# Patient Record
Sex: MALE | Race: OTHER | HISPANIC OR LATINO | URBAN - METROPOLITAN AREA
[De-identification: names, ages, dates, MRNs, and addresses within clinical notes are randomized per-mention and may not be internally consistent; named-entity substitution may affect disease eponyms.]

---

## 2022-02-02 ENCOUNTER — HOSPITAL ENCOUNTER (INPATIENT)
Facility: HOSPITAL | Age: 47
LOS: 3 days | Discharge: HOME | DRG: 812 | End: 2022-02-05
Attending: EMERGENCY MEDICINE | Admitting: EMERGENCY MEDICINE
Payer: MEDICAID

## 2022-02-02 ENCOUNTER — APPOINTMENT (INPATIENT)
Dept: CT IMAGING | Facility: HOSPITAL | Age: 47
DRG: 812 | End: 2022-02-02
Payer: MEDICAID

## 2022-02-02 ENCOUNTER — APPOINTMENT (EMERGENCY)
Dept: RADIOLOGY | Facility: HOSPITAL | Age: 47
DRG: 812 | End: 2022-02-02
Payer: MEDICAID

## 2022-02-02 DIAGNOSIS — T40.2X1A OPIOID OVERDOSE (HCC): Primary | ICD-10-CM

## 2022-02-02 DIAGNOSIS — R42 DIZZINESS: ICD-10-CM

## 2022-02-02 DIAGNOSIS — Z72.89 ALCOHOL USE: ICD-10-CM

## 2022-02-02 DIAGNOSIS — F14.90 COCAINE USE: ICD-10-CM

## 2022-02-02 DIAGNOSIS — J96.00 ACUTE RESPIRATORY FAILURE (HCC): ICD-10-CM

## 2022-02-02 DIAGNOSIS — E87.6 HYPOKALEMIA: ICD-10-CM

## 2022-02-02 DIAGNOSIS — I42.9 CARDIOMYOPATHY, UNSPECIFIED TYPE (HCC): ICD-10-CM

## 2022-02-02 PROBLEM — E83.51 HYPOCALCEMIA: Status: ACTIVE | Noted: 2022-02-02

## 2022-02-02 PROBLEM — E87.2 RESPIRATORY ACIDOSIS: Status: ACTIVE | Noted: 2022-02-02

## 2022-02-02 PROBLEM — J96.02 ACUTE RESPIRATORY FAILURE WITH HYPOXIA AND HYPERCAPNIA (HCC): Status: ACTIVE | Noted: 2022-02-02

## 2022-02-02 PROBLEM — J96.01 ACUTE RESPIRATORY FAILURE WITH HYPOXIA AND HYPERCAPNIA (HCC): Status: ACTIVE | Noted: 2022-02-02

## 2022-02-02 PROBLEM — F19.90 SUBSTANCE USE DISORDER: Status: ACTIVE | Noted: 2022-02-02

## 2022-02-02 PROBLEM — R73.9 HYPERGLYCEMIA: Status: ACTIVE | Noted: 2022-02-02

## 2022-02-02 LAB
4HR DELTA HS TROPONIN: 18 NG/L
ALBUMIN SERPL BCP-MCNC: 4.5 G/DL (ref 3–5.2)
ALP SERPL-CCNC: 66 U/L (ref 43–122)
ALT SERPL W P-5'-P-CCNC: 37 U/L
AMPHETAMINES SERPL QL SCN: NEGATIVE
ANION GAP SERPL CALCULATED.3IONS-SCNC: 12 MMOL/L (ref 5–14)
APAP SERPL-MCNC: <10 UG/ML (ref 10–20)
AST SERPL W P-5'-P-CCNC: 41 U/L (ref 17–59)
ATRIAL RATE: 89 BPM
BARBITURATES UR QL: NEGATIVE
BASE EX.OXY STD BLDV CALC-SCNC: 69.1 %
BASE EXCESS BLDV CALC-SCNC: -7.2 MMOL/L (ref -2.1–2.1)
BASOPHILS # BLD AUTO: 0.09 THOUSAND/UL (ref 0–0.1)
BASOPHILS NFR MAR MANUAL: 1 % (ref 0–1)
BENZODIAZ UR QL: NEGATIVE
BILIRUB SERPL-MCNC: 0.57 MG/DL
BUN SERPL-MCNC: 11 MG/DL (ref 5–25)
CALCIUM SERPL-MCNC: 8.3 MG/DL (ref 8.4–10.2)
CARDIAC TROPONIN I PNL SERPL HS: 22 NG/L
CARDIAC TROPONIN I PNL SERPL HS: 4 NG/L
CHLORIDE SERPL-SCNC: 104 MMOL/L (ref 97–108)
CO2 SERPL-SCNC: 25 MMOL/L (ref 22–30)
COCAINE UR QL: NEGATIVE
CREAT SERPL-MCNC: 0.9 MG/DL (ref 0.7–1.5)
EOSINOPHIL # BLD AUTO: 0.26 THOUSAND/UL (ref 0–0.4)
EOSINOPHIL NFR BLD MANUAL: 3 % (ref 0–6)
ERYTHROCYTE [DISTWIDTH] IN BLOOD BY AUTOMATED COUNT: 14.2 %
ETHANOL SERPL-MCNC: 95 MG/DL (ref 0–10)
FLUAV RNA RESP QL NAA+PROBE: NEGATIVE
FLUBV RNA RESP QL NAA+PROBE: NEGATIVE
GFR SERPL CREATININE-BSD FRML MDRD: 103 ML/MIN/1.73SQ M
GLUCOSE SERPL-MCNC: 195 MG/DL (ref 70–99)
HCO3 BLDV-SCNC: 24.1 MMOL/L (ref 23–28)
HCT VFR BLD AUTO: 42.8 % (ref 41–53)
HGB BLD-MCNC: 14.3 G/DL (ref 13.5–17.5)
LYMPHOCYTES # BLD AUTO: 3.78 THOUSAND/UL (ref 0.5–4)
LYMPHOCYTES # BLD AUTO: 43 % (ref 25–45)
MAGNESIUM SERPL-MCNC: 2.5 MG/DL (ref 1.6–2.3)
MCH RBC QN AUTO: 30.6 PG (ref 26–34)
MCHC RBC AUTO-ENTMCNC: 33.3 G/DL (ref 31–36)
MCV RBC AUTO: 92 FL (ref 80–100)
METHADONE UR QL: NEGATIVE
MONOCYTES # BLD AUTO: 0.79 THOUSAND/UL (ref 0.2–0.9)
MONOCYTES NFR BLD AUTO: 9 % (ref 1–10)
NEUTS BAND NFR BLD MANUAL: 1 % (ref 0–8)
NEUTS SEG # BLD: 3.87 THOUSAND/UL (ref 1.8–7.8)
NEUTS SEG NFR BLD AUTO: 43 %
O2 CT BLDV-SCNC: 14.6 ML/DL
OPIATES UR QL SCN: NEGATIVE
OXYCODONE+OXYMORPHONE UR QL SCN: NEGATIVE
P AXIS: 38 DEGREES
PCO2 BLDV: 76 MM HG (ref 41–51)
PCP UR QL: NEGATIVE
PH BLDV: 7.11 [PH] (ref 7.35–7.45)
PLATELET # BLD AUTO: 231 THOUSANDS/UL (ref 150–450)
PLATELET BLD QL SMEAR: ADEQUATE
PMV BLD AUTO: 8.8 FL (ref 8.9–12.7)
PO2 BLDV: 48 MM HG
POTASSIUM SERPL-SCNC: 2.7 MMOL/L (ref 3.6–5)
PR INTERVAL: 192 MS
PROT SERPL-MCNC: 7.8 G/DL (ref 5.9–8.4)
QRS AXIS: -13 DEGREES
QRSD INTERVAL: 108 MS
QT INTERVAL: 398 MS
QTC INTERVAL: 484 MS
RBC # BLD AUTO: 4.67 MILLION/UL (ref 4.5–5.9)
RBC MORPH BLD: NORMAL
RSV RNA RESP QL NAA+PROBE: NEGATIVE
SALICYLATES SERPL-MCNC: <1 MG/DL (ref 10–30)
SARS-COV-2 RNA RESP QL NAA+PROBE: NEGATIVE
SODIUM SERPL-SCNC: 141 MMOL/L (ref 137–147)
T WAVE AXIS: 14 DEGREES
THC UR QL: NEGATIVE
TOTAL CELLS COUNTED SPEC: 100
VENTRICULAR RATE: 89 BPM
WBC # BLD AUTO: 8.8 THOUSAND/UL (ref 4.5–11)

## 2022-02-02 PROCEDURE — 85027 COMPLETE CBC AUTOMATED: CPT | Performed by: EMERGENCY MEDICINE

## 2022-02-02 PROCEDURE — 83036 HEMOGLOBIN GLYCOSYLATED A1C: CPT | Performed by: EMERGENCY MEDICINE

## 2022-02-02 PROCEDURE — 80307 DRUG TEST PRSMV CHEM ANLYZR: CPT | Performed by: EMERGENCY MEDICINE

## 2022-02-02 PROCEDURE — 96376 TX/PRO/DX INJ SAME DRUG ADON: CPT

## 2022-02-02 PROCEDURE — 80053 COMPREHEN METABOLIC PANEL: CPT | Performed by: EMERGENCY MEDICINE

## 2022-02-02 PROCEDURE — 99291 CRITICAL CARE FIRST HOUR: CPT | Performed by: EMERGENCY MEDICINE

## 2022-02-02 PROCEDURE — 71045 X-RAY EXAM CHEST 1 VIEW: CPT

## 2022-02-02 PROCEDURE — 82077 ASSAY SPEC XCP UR&BREATH IA: CPT | Performed by: EMERGENCY MEDICINE

## 2022-02-02 PROCEDURE — 96365 THER/PROPH/DIAG IV INF INIT: CPT

## 2022-02-02 PROCEDURE — G1004 CDSM NDSC: HCPCS

## 2022-02-02 PROCEDURE — 80179 DRUG ASSAY SALICYLATE: CPT | Performed by: EMERGENCY MEDICINE

## 2022-02-02 PROCEDURE — 99291 CRITICAL CARE FIRST HOUR: CPT | Performed by: PHYSICIAN ASSISTANT

## 2022-02-02 PROCEDURE — 84484 ASSAY OF TROPONIN QUANT: CPT | Performed by: EMERGENCY MEDICINE

## 2022-02-02 PROCEDURE — 82805 BLOOD GASES W/O2 SATURATION: CPT | Performed by: EMERGENCY MEDICINE

## 2022-02-02 PROCEDURE — 85007 BL SMEAR W/DIFF WBC COUNT: CPT | Performed by: EMERGENCY MEDICINE

## 2022-02-02 PROCEDURE — 0241U HB NFCT DS VIR RESP RNA 4 TRGT: CPT | Performed by: EMERGENCY MEDICINE

## 2022-02-02 PROCEDURE — 70450 CT HEAD/BRAIN W/O DYE: CPT

## 2022-02-02 PROCEDURE — NC001 PR NO CHARGE: Performed by: PHYSICIAN ASSISTANT

## 2022-02-02 PROCEDURE — 99292 CRITICAL CARE ADDL 30 MIN: CPT | Performed by: EMERGENCY MEDICINE

## 2022-02-02 PROCEDURE — 99285 EMERGENCY DEPT VISIT HI MDM: CPT

## 2022-02-02 PROCEDURE — 80143 DRUG ASSAY ACETAMINOPHEN: CPT | Performed by: EMERGENCY MEDICINE

## 2022-02-02 PROCEDURE — 83735 ASSAY OF MAGNESIUM: CPT | Performed by: EMERGENCY MEDICINE

## 2022-02-02 PROCEDURE — HZ2ZZZZ DETOXIFICATION SERVICES FOR SUBSTANCE ABUSE TREATMENT: ICD-10-PCS | Performed by: EMERGENCY MEDICINE

## 2022-02-02 PROCEDURE — 93005 ELECTROCARDIOGRAM TRACING: CPT

## 2022-02-02 PROCEDURE — 96360 HYDRATION IV INFUSION INIT: CPT

## 2022-02-02 PROCEDURE — 93010 ELECTROCARDIOGRAM REPORT: CPT

## 2022-02-02 PROCEDURE — 36415 COLL VENOUS BLD VENIPUNCTURE: CPT | Performed by: EMERGENCY MEDICINE

## 2022-02-02 RX ORDER — BUPRENORPHINE AND NALOXONE 2; .5 MG/1; MG/1
4 FILM, SOLUBLE BUCCAL; SUBLINGUAL ONCE
Status: DISCONTINUED | OUTPATIENT
Start: 2022-02-02 | End: 2022-02-02

## 2022-02-02 RX ORDER — ONDANSETRON 2 MG/ML
4 INJECTION INTRAMUSCULAR; INTRAVENOUS EVERY 6 HOURS PRN
Status: DISCONTINUED | OUTPATIENT
Start: 2022-02-02 | End: 2022-02-05 | Stop reason: HOSPADM

## 2022-02-02 RX ORDER — NALOXONE HYDROCHLORIDE 0.4 MG/ML
4 INJECTION, SOLUTION INTRAMUSCULAR; INTRAVENOUS; SUBCUTANEOUS ONCE
Status: DISCONTINUED | OUTPATIENT
Start: 2022-02-02 | End: 2022-02-02

## 2022-02-02 RX ORDER — NALOXONE HYDROCHLORIDE 1 MG/ML
INJECTION INTRAMUSCULAR; INTRAVENOUS; SUBCUTANEOUS
Status: COMPLETED
Start: 2022-02-02 | End: 2022-02-02

## 2022-02-02 RX ORDER — NALOXONE HYDROCHLORIDE 1 MG/ML
1 INJECTION INTRAMUSCULAR; INTRAVENOUS; SUBCUTANEOUS ONCE
Status: COMPLETED | OUTPATIENT
Start: 2022-02-02 | End: 2022-02-02

## 2022-02-02 RX ORDER — ONDANSETRON 2 MG/ML
4 INJECTION INTRAMUSCULAR; INTRAVENOUS ONCE
Status: COMPLETED | OUTPATIENT
Start: 2022-02-02 | End: 2022-02-02

## 2022-02-02 RX ORDER — ONDANSETRON 2 MG/ML
INJECTION INTRAMUSCULAR; INTRAVENOUS
Status: COMPLETED
Start: 2022-02-02 | End: 2022-02-02

## 2022-02-02 RX ORDER — NALOXONE HYDROCHLORIDE 0.4 MG/ML
1 INJECTION, SOLUTION INTRAMUSCULAR; INTRAVENOUS; SUBCUTANEOUS ONCE
Status: COMPLETED | OUTPATIENT
Start: 2022-02-02 | End: 2022-02-02

## 2022-02-02 RX ORDER — NALOXONE HYDROCHLORIDE 1 MG/ML
4 INJECTION INTRAMUSCULAR; INTRAVENOUS; SUBCUTANEOUS ONCE
Status: COMPLETED | OUTPATIENT
Start: 2022-02-02 | End: 2022-02-02

## 2022-02-02 RX ORDER — NALOXONE HYDROCHLORIDE 1 MG/ML
2 INJECTION INTRAMUSCULAR; INTRAVENOUS; SUBCUTANEOUS ONCE
Status: COMPLETED | OUTPATIENT
Start: 2022-02-02 | End: 2022-02-02

## 2022-02-02 RX ORDER — BUPRENORPHINE AND NALOXONE 2; .5 MG/1; MG/1
4 FILM, SOLUBLE BUCCAL; SUBLINGUAL DAILY
Status: DISCONTINUED | OUTPATIENT
Start: 2022-02-03 | End: 2022-02-02

## 2022-02-02 RX ORDER — POTASSIUM CHLORIDE 14.9 MG/ML
20 INJECTION INTRAVENOUS
Status: COMPLETED | OUTPATIENT
Start: 2022-02-02 | End: 2022-02-03

## 2022-02-02 RX ADMIN — POTASSIUM CHLORIDE 20 MEQ: 14.9 INJECTION, SOLUTION INTRAVENOUS at 18:39

## 2022-02-02 RX ADMIN — NALOXONE HYDROCHLORIDE 1 MG: 1 INJECTION INTRAMUSCULAR; INTRAVENOUS; SUBCUTANEOUS at 16:22

## 2022-02-02 RX ADMIN — NALOXONE HYDROCHLORIDE 1 MG: 0.4 INJECTION, SOLUTION INTRAMUSCULAR; INTRAVENOUS; SUBCUTANEOUS at 17:24

## 2022-02-02 RX ADMIN — NALOXONE HYDROCHLORIDE 4 MG: 1 INJECTION PARENTERAL at 18:33

## 2022-02-02 RX ADMIN — NALOXONE HYDROCHLORIDE 2 MG: 1 INJECTION INTRAMUSCULAR; INTRAVENOUS; SUBCUTANEOUS at 15:26

## 2022-02-02 RX ADMIN — NALOXONE HYDROCHLORIDE 4 MG/HR: 1 INJECTION PARENTERAL at 19:47

## 2022-02-02 RX ADMIN — ONDANSETRON 4 MG: 2 INJECTION INTRAMUSCULAR; INTRAVENOUS at 17:03

## 2022-02-02 RX ADMIN — NALOXONE HYDROCHLORIDE 4 MG: 1 INJECTION INTRAMUSCULAR; INTRAVENOUS; SUBCUTANEOUS at 18:33

## 2022-02-02 RX ADMIN — NALOXONE HYDROCHLORIDE 2 MG/HR: 1 INJECTION INTRAMUSCULAR; INTRAVENOUS; SUBCUTANEOUS at 17:00

## 2022-02-02 RX ADMIN — NALOXONE HYDROCHLORIDE 1 MG/HR: 1 INJECTION INTRAMUSCULAR; INTRAVENOUS; SUBCUTANEOUS at 15:40

## 2022-02-02 RX ADMIN — POTASSIUM CHLORIDE 20 MEQ: 14.9 INJECTION, SOLUTION INTRAVENOUS at 17:05

## 2022-02-02 RX ADMIN — NALOXONE HYDROCHLORIDE 3 MG/HR: 1 INJECTION INTRAMUSCULAR; INTRAVENOUS; SUBCUTANEOUS at 18:56

## 2022-02-02 RX ADMIN — SODIUM CHLORIDE 1000 ML: 0.9 INJECTION, SOLUTION INTRAVENOUS at 15:32

## 2022-02-02 NOTE — ASSESSMENT & PLAN NOTE
Patient with h/o occasional alcohol use  · Reports he currently consumes 5-6 beers weekly/bi-weekly  · Reports he consumed 7 beers today   Pertinent labs:  Ethanol 95 (1611 pm)  Given reported infrequent use of alcohol, do not suspect severe alcohol withdrawal to occur, however will monitor during admission  Encourage cessation  Consult case management for assistance with rehab resource

## 2022-02-02 NOTE — ASSESSMENT & PLAN NOTE
· CMP 2/2/2022 revealed K+ 2 7  magnesium 2 5  · Supplementation ordered   · Continue to monitor and optimize electrolytes

## 2022-02-02 NOTE — ASSESSMENT & PLAN NOTE
· Patient reports intermittent cocaine abuse but denies opioid abuse  · Reports he occasionally snorts cocaine every 3-4 months   · States he took 2 snorts of what he thought was cocaine earlier today   · Consult case management for further coordination of care

## 2022-02-02 NOTE — ASSESSMENT & PLAN NOTE
· Secondary to accidental opioid overdose  · Patient claims to be opioid naive as he states he believes he was purchasing cocaine  · Patient is currently protecting his airway with opioid reversal treatment  · CXR in ED- no acute cardiopulmonary disease  · CT head negative   · Monitor HS trops for cardiac injury  · Initial HS trop 4  · Continue naloxone infusion with PRN naloxone bolusing      · Continue to monitor vitals/symptoms

## 2022-02-02 NOTE — ED PROVIDER NOTES
History  Chief Complaint   Patient presents with    Overdose - Accidental     Pt found at a garage unresponsive after using a white substance nasally  narcan gievn by EMS     82-year-old male with unknown past medical history presents with EMS for unresponsiveness and respiratory arrest after being found unresponsive at a garage  Patient was picking up his car and reportedly went to the bathroom  He then returned to his car and was found slumped over with a white powder on his nose  Car was not running, and there was adequate ventilation  Low concern for carbon monoxide toxicity  Patient had improvement in his respirations with naloxone  He received 0 8 mg IV with EMS  On arrival, patient is apneic and unable to answer questions  He exhibits signs and symptoms of opioid toxicity  After additional naloxone administration, patient admits to using cocaine  He is unable to provide further history and repeatedly states he feels dizzy  His wife provided further history at the bedside  She states patient does not use illicit substances  He was drinking alcohol today but does not drink every day  She states when he does not eat, his blood sugar becomes low, but otherwise he does not have any other acute medical problems  This is the 1st time he has been to the hospital since she has known him  None       History reviewed  No pertinent past medical history  History reviewed  No pertinent surgical history  History reviewed  No pertinent family history  I have reviewed and agree with the history as documented  E-Cigarette/Vaping     E-Cigarette/Vaping Substances     Social History     Tobacco Use    Smoking status: Unknown If Ever Smoked    Smokeless tobacco: Not on file   Substance Use Topics    Alcohol use: Not on file    Drug use: Yes     Types: Cocaine, Heroin       Review of Systems   Unable to perform ROS: Acuity of condition   Neurological: Positive for dizziness         Physical Exam  Physical Exam  Vitals and nursing note reviewed  Constitutional:       General: He is in acute distress  Appearance: He is ill-appearing  Comments: Apneic, unresponsive   HENT:      Head: Normocephalic and atraumatic  Nose: Nose normal       Mouth/Throat:      Mouth: Mucous membranes are moist    Eyes:      General: No scleral icterus  Right eye: No discharge  Left eye: No discharge  Comments: Pinpoint pupils bilaterally, minimally reactive to light   Cardiovascular:      Rate and Rhythm: Normal rate and regular rhythm  Pulmonary:      Breath sounds: No wheezing, rhonchi or rales  Comments: Apneic, hypoventilation  Abdominal:      General: There is no distension  Palpations: Abdomen is soft  Tenderness: There is no abdominal tenderness  Musculoskeletal:         General: No swelling, tenderness or deformity  Cervical back: Neck supple  No deformity  Thoracic back: No deformity  Lumbar back: No deformity  Skin:     General: Skin is warm and dry  Findings: No rash  Neurological:      General: No focal deficit present  Comments: Minimally responsive to sternal rub after naloxone  Intermittently follows commands     Psychiatric:      Comments: Unable to assess         Vital Signs  ED Triage Vitals   Temperature Pulse Respirations Blood Pressure SpO2   02/02/22 1700 02/02/22 1524 02/02/22 1524 02/02/22 1524 02/02/22 1524   (!) 97 4 °F (36 3 °C) 94 12 138/71 100 %      Temp Source Heart Rate Source Patient Position - Orthostatic VS BP Location FiO2 (%)   02/02/22 1700 02/02/22 1524 02/02/22 1524 02/02/22 1524 --   Oral Monitor Lying Left arm       Pain Score       02/02/22 1600       No Pain           Vitals:    02/02/22 1600 02/02/22 1630 02/02/22 1700 02/02/22 1715   BP: 125/70 109/73 126/70 118/73   Pulse: 90 69 71 87   Patient Position - Orthostatic VS: Lying Lying Lying Lying         Visual Acuity      ED Medications  Medications naloxone (NARCAN) 2 mg in sodium chloride 0 9 % 500 mL infusion (2 5 mg/hr Intravenous Rate/Dose Change 2/2/22 1724)   potassium chloride 20 mEq IVPB (premix) (20 mEq Intravenous New Bag 2/2/22 1705)   naloxone [facility]) injection 1 mg (has no administration in time range)   sodium chloride 0 9 % bolus 1,000 mL (0 mL Intravenous Stopped 2/2/22 1622)   naloxone (NARCAN) injection 2 mg (2 mg Intravenous Given 2/2/22 1526)   naloxone (NARCAN) injection 1 mg (1 mg Intravenous Given 2/2/22 1622)   ondansetron (ZOFRAN) injection 4 mg (4 mg Intravenous Given 2/2/22 1703)       Diagnostic Studies  Results Reviewed     Procedure Component Value Units Date/Time    COVID/FLU/RSV - 2 hour TAT [[id number]]  (Normal) Collected: 02/02/22 1608    Lab Status: Final result Specimen: Nares from Nose Updated: 02/02/22 1707     SARS-CoV-2 Negative     INFLUENZA A PCR Negative     INFLUENZA B PCR Negative     RSV PCR Negative    Narrative:      FOR PEDIATRIC PATIENTS - copy/paste COVID Guidelines URL to browser: [email or web address]    SARS-CoV-2 assay is a Nucleic Acid Amplification assay intended for the  qualitative detection of nucleic acid from SARS-CoV-2 in nasopharyngeal  swabs  Results are for the presumptive identification of SARS-CoV-2 RNA  Positive results are indicative of infection with SARS-CoV-2, the virus  causing COVID-19, but do not rule out bacterial infection or co-infection  with other viruses  Laboratories within the [place] and its  territories are required to report all positive results to the appropriate  public health authorities  Negative results do not preclude SARS-CoV-2  infection and should not be used as the sole basis for treatment or other  patient management decisions  Negative results must be combined with  clinical observations, patient history, and epidemiological information  This test has not been FDA cleared or approved      This test has been authorized by FDA under an Emergency Use Authorization  (EUA)  This test is only authorized for the duration of time the  declaration that circumstances exist justifying the authorization of the  emergency use of an in vitro diagnostic tests for detection of SARS-CoV-2  virus and/or diagnosis of COVID-19 infection under section 564(b)(1) of  the Act, 21 U  S C  471PFB-4(Y)(8), unless the authorization is terminated  or revoked sooner  The test has been validated but independent review by FDA  and CLIA is pending  Test performed using [employer]: This RT-PCR assay targets N2,  a region unique to SARS-CoV-2  A conserved region in the E-gene was chosen  for pan-Sarbecovirus detection which includes SARS-CoV-2  Ethanol [[id number]]  (Abnormal) Collected: 02/02/22 1611    Lab Status: Final result Specimen: Blood from Arm, Left Updated: 02/02/22 1645     Ethanol Lvl 95 mg/dL     Manual Differential (Non Wam) [[id number]]  (Abnormal) Collected: 02/02/22 1530    Lab Status: Final result Specimen: Blood from Arm, Right Updated: 02/02/22 1632     Segmented % 43 %      Bands % 1 %      Lymphocytes % 43 %      Monocytes % 9 %      Eosinophils, % 3 %      Basophils % 1 %      Neutrophils Absolute 3 87 Thousand/uL      Lymphocytes Absolute 3 78 Thousand/uL      Monocytes Absolute 0 79 Thousand/uL      Eosinophils Absolute 0 26 Thousand/uL      Basophils Absolute 0 09 Thousand/uL      Total Counted 100     RBC Morphology Normal     Platelet Estimate Adequate    Acetaminophen level-If concentration is detectable, please discuss with medical  on call   [[id number]]  (Abnormal) Collected: 02/02/22 1606    Lab Status: Final result Specimen: Blood from Arm, Right Updated: 02/02/22 1631     Acetaminophen Level <77 ug/mL     Salicylate level [[id number]]  (Abnormal) Collected: 02/02/22 1606    Lab Status: Final result Specimen: Blood from Arm, Right Updated: 00/52/11 6602     Salicylate Lvl <7 1 mg/dL     HS Troponin 0hr (reflex protocol) [[id number]]  (Normal) Collected: 02/02/22 1530    Lab Status: Final result Specimen: Blood from Arm, Right Updated: 02/02/22 1619     hs TnI 0hr 4 ng/L     HS Troponin I 2hr [[id number]]     Lab Status: No result Specimen: Blood     HS Troponin I 4hr [[id number]]     Lab Status: No result Specimen: Blood     Comprehensive metabolic panel [[id number]]  (Abnormal) Collected: 02/02/22 1530    Lab Status: Final result Specimen: Blood from Arm, Right Updated: 02/02/22 1617     Sodium 141 mmol/L      Potassium 2 7 mmol/L      Chloride 104 mmol/L      CO2 25 mmol/L      ANION GAP 12 mmol/L      BUN 11 mg/dL      Creatinine 0 90 mg/dL      Glucose 195 mg/dL      Calcium 8 3 mg/dL      AST 41 U/L      ALT 37 U/L      Alkaline Phosphatase 66 U/L      Total Protein 7 8 g/dL      Albumin 4 5 g/dL      Total Bilirubin 0 57 mg/dL      eGFR 103 ml/min/1 73sq m     Narrative:      [place] guidelines for Chronic Kidney Disease (CKD):     Stage 1 with normal or high GFR (GFR > 90 mL/min/1 73 square meters)    Stage 2 Mild CKD (GFR = 60-89 mL/min/1 73 square meters)    Stage 3A Moderate CKD (GFR = 45-59 mL/min/1 73 square meters)    Stage 3B Moderate CKD (GFR = 30-44 mL/min/1 73 square meters)    Stage 4 Severe CKD (GFR = 15-29 mL/min/1 73 square meters)    Stage 5 End Stage CKD (GFR <15 mL/min/1 73 square meters)  Note: GFR calculation is accurate only with a steady state creatinine    Magnesium [[id number]]  (Abnormal) Collected: 02/02/22 1530    Lab Status: Final result Specimen: Blood from Arm, Right Updated: 02/02/22 1608     Magnesium 2 5 mg/dL     CBC and differential [[id number]]  (Abnormal) Collected: 02/02/22 1530    Lab Status: Final result Specimen: Blood from Arm, Right Updated: 02/02/22 1557     WBC 8 80 Thousand/uL      RBC 4 67 Million/uL      Hemoglobin 14 3 g/dL      Hematocrit 42 8 %      MCV 92 fL      MCH 30 6 pg      MCHC 33 3 g/dL      RDW 14 2 % MPV 8 8 fL      Platelets 650 Thousands/uL     Blood gas, venous [[id number]]  (Abnormal) Collected: 02/02/22 1530    Lab Status: Final result Specimen: Blood from Arm, Right Updated: 02/02/22 1557     pH, Jase 7 110     pCO2, Jase 76 0 mm Hg      pO2, Jase 48 0 mm Hg      HCO3, Jase 24 1 mmol/L      Base Excess, Jase -7 2 mmol/L      O2 Content, Jase 14 6 ml/dL      O2 HGB, VENOUS 69 1 %     Rapid drug screen, urine [[id number]]     Lab Status: No result Specimen: Urine                  CT head without contrast   Final Result by Yulisa Mckenzie MD (02/02 1707)      No acute intracranial abnormality  Workstation performed: [id number]         XR chest 1 view portable   ED Interpretation by Shonda Sifuentes MD (02/02 9608)   No acute disease      Final Result by Fani Davidson MD (02/02 8707)      1  No acute cardiopulmonary disease  2   Partially imaged air-filled dilated loops of bowel overlying the left upper abdomen, dedicated imaging of the abdomen could further evaluate if clinically indicated                    Workstation performed: [id number]                    Procedures  ECG 12 Lead Documentation Only    Date/Time: 2/2/2022 4:48 PM  Performed by: Shonda Sifuentes MD  Authorized by: Shonda Sifuentes MD     Indications / Diagnosis:  Overdose  ECG reviewed by me, the ED Provider: yes    Patient location:  ED  Previous ECG:     Previous ECG:  Unavailable  Interpretation:     Interpretation: abnormal    Rate:     ECG rate:  89    ECG rate assessment: normal    Rhythm:     Rhythm: sinus rhythm    Ectopy:     Ectopy: none    QRS:     QRS axis:  Left    QRS intervals:  Normal  Conduction:     Conduction: normal    ST segments:     ST segments:  Normal  T waves:     T waves: normal    Other findings:     Other findings: prolonged qTc interval      CriticalCare Time  Performed by: Shonda Sifuentes MD  Authorized by: Marquis Hawkins Shara Arellano MD     Critical care provider statement:     Critical care time (minutes):  75    Critical care time was exclusive of:  Separately billable procedures and treating other patients and teaching time    Critical care was necessary to treat or prevent imminent or life-threatening deterioration of the following conditions:  Cardiac failure, respiratory failure, CNS failure or compromise, toxidrome and metabolic crisis    Critical care was time spent personally by me on the following activities:  Blood draw for specimens, obtaining history from patient or surrogate, evaluation of patient's response to treatment, examination of patient, ordering and performing treatments and interventions, ordering and review of laboratory studies, ordering and review of radiographic studies and re-evaluation of patient's condition    I assumed direction of critical care for this patient from another provider in my specialty: no    Comments:      Patient required frequent re-evaluations of response to naloxone with repeated episodes of apnea and hypoventilation  Required numerous titrations of naloxone infusion in addition to repeated boluses of naloxone  Required electrolyte repletion (hypokalemia) to prevent life-threatening dysrhythmia               ED Course  ED Course as of 02/02/22 1753   Wed Feb 02, 2022   1557 pH, Jase(!): 7 110   1557 pCO2, Jase(!): 76 0   1611 Magnesium(!): 2 5   1619 Potassium(!!): 2 7   1619 hs TnI 0hr: 4   8125 SALICYLATE LEVEL(!): <3 0   1633 ACETAMINOPHEN LEVEL(!): <10   1633 CBC and differential(!)  Grossly normal   1633 Segs Relative(!): 43   1649 MEDICAL ALCOHOL(!): 95   1708 SARS-COV-2: Negative   1708 INFLU A PCR: Negative   1708 INFLU B PCR: Negative   1708 RSV PCR: Negative   1715 CT head without contrast  No acute intracranial abnormality                               SBIRT 22yo+      Most Recent Value   SBIRT (22 yo +)    In order to provide better care to our patients, we are screening all of our patients for alcohol and drug use  Would it be okay to ask you these screening questions? Unable to answer at this time Filed at: 02/02/2022 5605                    [facility]  Number of Diagnoses or Management Options  [facility]  Alcohol use  Cocaine use  Hypokalemia  Opioid overdose [facility])  Diagnosis management comments: 53-year-old male presenting for evaluation of unresponsiveness and respiratory failure  Patient exhibits signs and symptoms of opioid toxicity on arrival without apnea  Patient given several doses of naloxone with continued hypoventilation  He was placed on a naloxone infusion but still required repeated stimulation and titration of the infusion to maintain adequate ventilation respirations  EKG shows sinus rhythm without evidence of ischemia or malignant dysrhythmia  Initial troponin is 4  Laboratory studies notable for hypokalemia  Chest x-ray without significant cardiopulmonary abnormality  CT of the head is grossly normal   Patient given electrolyte replacement and maintained on cardiac monitor  Patient will be admitted to the medical detox unit for further management  Patient's wife is in agreement with this plan        Disposition  Final diagnoses:   Opioid overdose ([facility] )   Cocaine use   Alcohol use   Acute respiratory failure ([facility] )   Hypokalemia     Time reflects when diagnosis was documented in both MDM as applicable and the Disposition within this note     Time User Action Codes Description Comment    2/2/2022  4:07 PM Rona Matute Add [T40 2X1A] Opioid overdose ([facility] )     2/2/2022  4:08 PM Rona Matute Add [F14 90] Cocaine use     2/2/2022  4:08 PM Rona Matute Add [Z72 89] Alcohol use     2/2/2022  4:08 PM Rona Matute Add [J96 00] Acute respiratory failure ([facility] )     2/2/2022  5:05 PM Ancil Maxin Add [E87 6] Hypokalemia       ED Disposition     ED Disposition Condition Date/Time Comment    Admit Stable Wed Feb 2, 2022  4:07 PM Case was discussed with Dr Osmani Camacho and the patient's admission status was agreed to be Admission Status: inpatient status to the service of Dr Osmani Camacho   Follow-up Information    None         There are no discharge medications for this patient  No discharge procedures on file      PDMP Review     None          ED Provider  Electronically Signed by           Randy Palm MD  02/02/22 9415

## 2022-02-02 NOTE — H&P
[address] [facility] 1975, 42 y o  male MRN: [id number]  Unit/Bed#: 5T DETOX 501-01 Encounter: [id number]  Primary Care Provider: No primary care provider on file  Date and time admitted to hospital: 2/2/2022  3:21 PM    HISTORY & PHYSICAL EXAM  DEPARTMENT OF MEDICAL TOXICOLOGY  LEVEL 4 MEDICAL DETOX UNIT  Tee Quintana 42 y o  male MRN: [id number]  Unit/Bed#: 5T DETOX 501-01 Encounter: [id number]      Reason for Admission/Principal Problem: acute respiratory failure 2/2 overdose   Admitting Provider: Ronak Barnett PA-C  Attending Provider: Mika Parra DO   2/2/2022  3:21 PM    * Acute respiratory failure with hypoxia and hypercapnia ([facility] [address] )  Assessment & Plan  · Secondary to accidental opioid overdose  · Patient claims to be opioid naive as he states he believes he was purchasing cocaine  · Patient is currently protecting his airway with opioid reversal treatment  · CXR in ED- no acute cardiopulmonary disease  · CT head negative   · Monitor HS trops for cardiac injury  · Initial HS trop 4  · Continue naloxone infusion with PRN naloxone bolusing  · Continue to monitor vitals/symptoms     Respiratory acidosis  Assessment & Plan  · Secondary to opioid-induced respiratory failure  · VBG revealed pH 7 11, pCO2 76  · Continue resp failure management as above  · Continue to monitor with supportive care  Substance use disorder  Assessment & Plan  · Patient reports intermittent cocaine abuse but denies opioid abuse  · Reports he occasionally snorts cocaine every 3-4 months   · States he took 2 snorts of what he thought was cocaine earlier today   · Consult case management for further coordination of care      Alcohol use  Assessment & Plan  Patient with h/o occasional alcohol use  · Reports he currently consumes 5-6 beers weekly/bi-weekly  · Reports he consumed 7 beers today   Pertinent labs:  Ethanol 95 (1611 pm)  Given reported infrequent use of alcohol, do not suspect severe alcohol withdrawal to occur, however will monitor during admission  Encourage cessation  Consult case management for assistance with rehab resource    Hyperglycemia  Assessment & Plan  · Likely a stress reaction  · check an A1c as patient has not been to a doctor for a checkup in many years  Hypocalcemia  Assessment & Plan  · Continue to optimize electrolytes    Hypokalemia  Assessment & Plan  · CMP 2/2/2022 revealed K+ 2 7  magnesium 2 5  · Supplementation ordered   · Continue to monitor and optimize electrolytes  Additional medical treatment(s) includes: substance use, alcohol use, electrolyte disturbances    VTE Prophylaxis: Enoxaparin (Lovenox)  / sequential compression device   Code Status: level 1 full code   POLST: POLST form is not discussed and not completed at this time  Discussion with family: I did not personally discuss patient with family at this time  Anticipated Length of Stay:  Patient will be admitted on an Inpatient basis with an anticipated length of stay of  2  midnights  Justification for Hospital Stay: ongoing management of acute respiratory failure 2/2 overdose     For any questions or concerns, please Tiger Text the advanced practitioner in the role of [facility]-DETOX-AP On Call      This patient qualifies for Level IV medically managed intensive inpatient services under the criteria set by the American Society of Addiction Medicine, including dimensions 1-3   The patient is in withdrawal (or is intoxicated with high risk of withdrawal), with severe and unstable medical and/or psychiatric (dual diagnosis) problems, requiring requires 24-hour medical and nursing care and the full resources of a licensed hospital       Adjunctive medications administered as needed:  Clonidine 0 1 mg PO Q6 hours PRN anxiety or palpitations    Gabapentin 300mg PO Q8 hours PRN anxiety    Ibuprofen 600 mg PO Q6 hours PRN pain    Acetaminophen 1000mg PO Q8 hours PRN pain    Ondansetron 4 mg PO Q6 hours PRN N/V    Nicotine patch 7, 14, 21 mg  PRN nicotine withdrawal   Trazodone 50 mg PO QHS PRN sleep    Loperamide 4 mg PO PRN diarrhea up to 16 mg/day     The risks, benefits and mechanism of buprenorphine/naloxone were discussed and patient agreed to treatment  Case management consultation will take place to assist with coordination of subsequent treatment after discharge  Administer daily multivitamin  Evaluate and treat for coexisting substance use, such as nicotine  Discuss risk factors for infectious disease, such as history of intravenous drug abuse, and offer hepatitis and HIV screening if indicated  **PLEASE NOTE- [place]  Darrion Resendiz [id number] assisted with Frisian interpretation     Hx and PE limited by: lethargic during encounter requiring frequent sternal rub to arouse     HPI: Eduin Pérez is a 42y o  year old male PMH of cocaine abuse and intermittent alcohol use who presents with acute respiratory failure likely due to opioid overdose  Patient originally presented to [facility] ED via EMS after being found unresponsive  Reportedly snorted a white substance that he believed to be cocaine while he was at his   Found in his car without car running, per ED, low suspicion for CO poisoning  Received 0 8 mg IV narcan by EMS en route to ED  Upon arrival to ED, patient was apneic and exhibited s/sx of opioid toxicity  Received additional doses of narcan in ED and narcan drip was subsequently started  Wife accompanied patient in ED and stated he does not use illicit substances, was consuming alcohol today but does not drink daily, denied acute medical conditions for patient  Head CT and CXR performed in ED unremarkable without acute abnormalities  Noted to be hypokalemic in ED and supplementation ordered  Narcan drip further titrated upon admission  Reports he drank 7 beers throughout the day today   States he snorted 2 snorts of what he believed to be cocaine PTA  Currently states he is tired when repeatedly aroused with sternal rub  One episode of non-bloody emesis during evaluation, currently notes ongoing nausea  Denies acute pain at this time, denies chest pain, denies breathing difficulties  States he occasionally consumes 5-6 beers weekly  Denies other substance use aside from cocaine  Other past medical history is unknown- patient denies chronic medical conditions currently and denies current medications  Reports he only use cocaine ever 3-4 months  Route of use: insufflation  Date/Time of Last Use: 2/2/2022; 2 snorts PTA via EMS  History of IVDA? Denies   Co-existing substance use? No- patient denies use of marijuana, meth, heroin, opioids, oxycodone    Review of PDMP: yes    Social History     Substance and Sexual Activity   Alcohol Use Yes    Alcohol/week: 5 0 standard drinks    Types: 5 Cans of beer per week     Social History     Substance and Sexual Activity   Drug Use Yes    Types: Cocaine     Social History     Tobacco Use   Smoking Status Unknown If Ever Smoked   Smokeless Tobacco Not on file       Review of Systems   Unable to perform ROS: Acuity of condition (intermittently responsive)   Constitutional: Positive for fatigue  Negative for appetite change, chills and fever  HENT: Negative for congestion, ear pain, sneezing and sore throat  Respiratory: Negative for cough and shortness of breath  Cardiovascular: Negative for chest pain and palpitations  Gastrointestinal: Positive for nausea and vomiting  Negative for abdominal pain, constipation and diarrhea  Genitourinary: Negative for difficulty urinating, dysuria and hematuria  Musculoskeletal: Negative for arthralgias and back pain  Skin: Negative for color change and rash  Neurological: Positive for dizziness and weakness  Negative for seizures, syncope, light-headedness and headaches     All other systems reviewed and are negative  Historical Information   History reviewed  No pertinent past medical history  History reviewed  No pertinent surgical history  History reviewed  No pertinent family history  Social History   Marital Status: Unknown   Occupation: [employer]  Patient Pre-hospital Living Situation: with wife   Patient Pre-hospital Level of Mobility: independent   Patient Pre-hospital Diet Restrictions: none    No Known Allergies    Prior to Admission medications    Not on File       Current Facility-Administered Medications   Medication Dose Route Frequency    [START ON 2/3/2022] enoxaparin (LOVENOX) subcutaneous injection 40 mg  40 mg Subcutaneous Daily    naloxone (NARCAN) 2 mg in sodium chloride 0 9 % 500 mL infusion  2 mg/hr Intravenous Continuous    naloxone (NARCAN) 20 mg in sodium chloride 0 9 % 500 mL infusion  4 mg/hr Intravenous Continuous    potassium chloride 20 mEq IVPB (premix)  20 mEq Intravenous Q2H       Continuous Infusions:naloxone (NARCAN) 500 mL infusion, 2 mg/hr, Last Rate: 4 mg/hr (02/02/22 1918)  naloxone (NARCAN) 500 mL infusion, 4 mg/hr             Objective       Intake/Output Summary (Last 24 hours) at 2/2/2022 1936  Last data filed at 2/2/2022 1918  Gross per 24 hour   Intake 2856 25 ml   Output 525 ml   Net 2331 25 ml       Invasive Devices:   Peripheral IV 02/02/22 Right Antecubital (Active)       Peripheral IV 02/02/22 Left Antecubital (Active)   Site Assessment WDL 02/02/22 1533   Dressing Type Transparent;Securing device 02/02/22 1533   Line Status Blood return noted; Flushed 02/02/22 1533   Dressing Status Clean;Dry; Intact 02/02/22 1533       Vitals   Vitals:    02/02/22 1715 02/02/22 1800 02/02/22 1835 02/02/22 1900   BP: 118/73 128/82  129/84   TempSrc:  Temporal     Pulse: 87 78 85 73   Resp: (!) 6 (!) 10 20 16   Patient Position - Orthostatic VS: Lying Lying     Temp:  (!) 97 2 °F (36 2 °C)         Physical Exam  Vitals reviewed     Constitutional:       General: He is in acute distress  Appearance: Normal appearance  He is obese  He is ill-appearing  He is not diaphoretic  HENT:      Head: Normocephalic and atraumatic  Nose: Nose normal  No congestion  Mouth/Throat:      Mouth: Mucous membranes are moist       Pharynx: Oropharynx is clear  Eyes:      General: No scleral icterus  Extraocular Movements: Extraocular movements intact  Conjunctiva/sclera: Conjunctivae normal       Pupils:      Right eye: Pupil is sluggish  Left eye: Pupil is sluggish  Comments: Pinpoint pupils b/l, minimally responsive   Cardiovascular:      Rate and Rhythm: Normal rate and regular rhythm  Pulses: Normal pulses  Heart sounds: Normal heart sounds  No murmur heard  No friction rub  No gallop  Pulmonary:      Effort: Bradypnea and respiratory distress present  Breath sounds: Normal breath sounds  No wheezing, rhonchi or rales  Abdominal:      General: Abdomen is flat  Bowel sounds are normal  There is no distension  Palpations: Abdomen is soft  Tenderness: There is no abdominal tenderness  There is no guarding  Comments: 1 episode of non-bloody emesis during encounter  Musculoskeletal:         General: No swelling  Normal range of motion  Cervical back: Normal range of motion  Right lower leg: No edema  Left lower leg: No edema  Skin:     General: Skin is warm and dry  Capillary Refill: Capillary refill takes less than 2 seconds  Findings: Abrasion present  Neurological:      General: No focal deficit present  Mental Status: He is oriented to person, place, and time  He is lethargic  Cranial Nerves: No facial asymmetry  Sensory: No sensory deficit  Comments: Patient is lethargic during encounter, repeatedly requiring sternal rub to arouse  Oriented to person, place, month, year  Moves upper and lower extremities symmetrically   strength equal b/l, dorsiflexion equal b/l  DATA     EKG, Pathology, and Other Studies: I have personally reviewed pertinent reports  · EKG (2/2/2022): "Normal sinus rhythm  Minimal voltage criteria for LVH, may be normal variant  Prolonged QT  Abnormal ECG  No previous ECGs available " (Confirmed by Heidi Mcmanus ([id number]) on 2/2/2022 6:07:43 PM)  · CXR 2/2/2022: "No acute cardiopulmonary disease "  · CT head 2/2/2022: "No acute intracranial abnormality "    Lab Results: I have personally reviewed pertinent reports          CBC ETOH     Lab Results   Component Value Date    WBC 8 80 02/02/2022    RBC 4 67 02/02/2022    HGB 14 3 02/02/2022    HCT 42 8 02/02/2022    MCV 92 02/02/2022    MCH 30 6 02/02/2022    MCHC 33 3 02/02/2022    RDW 14 2 02/02/2022     02/02/2022    MPV 8 8 (L) 02/02/2022      No results found for: LACTICACID   CMP UA         Component Value Date/Time    K 2 7 (LL) 02/02/2022 1530     02/02/2022 1530    CO2 25 02/02/2022 1530    BUN 11 02/02/2022 1530    CREATININE 0 90 02/02/2022 1530         Component Value Date/Time    CALCIUM 8 3 (L) 02/02/2022 1530    ALKPHOS 66 02/02/2022 1530    AST 41 02/02/2022 1530    ALT 37 02/02/2022 1530      No results found for: CLARITYU, COLORU, SPECGRAV, PHUR, GLUCOSEU, KETONESU, BLOODU, PROTEIN UA, NITRITE, BILIRUBINUR, UROBILINOGEN, LEUKOCYTESUR, WBCUA, RBCUA, HYALINE, BACTERIA, EPIS     Liver Function Test: ASA     Lab Results   Component Value Date    TBILI 0 57 02/02/2022    ALKPHOS 66 02/02/2022    AST 41 02/02/2022    ALT 37 02/02/2022    TP 7 8 02/02/2022    ALB 4 5 02/02/2022      Lab Results   Component Value Date    SALICYLATE <8 8 (L) 98/53/3471      Troponin APAP     No results found for: TROPONINI   Lab Results   Component Value Date    ACTMNPHEN <10 (L) 02/02/2022      VBG HCG     Lab Results   Component Value Date/Time    PHVEN 7 110 (L) 02/02/2022 03:30 PM    WBR4OLX 76 0 (H) 02/02/2022 03:30 PM    PO2VEN 48 0 02/02/2022 03:30 PM    OSF5WKE 24 1 02/02/2022 03:30 PM BEVEN -7 2 (L) 02/02/2022 03:30 PM    N6WZHDZOB 14 6 02/02/2022 03:30 PM    B4JWKWJ 69 1 (L) 02/02/2022 03:30 PM      No results found for: HCGQUANT   ABG Urine Drug Screen     No results found for: PHART, OPF4DPC, PO2ART, EBK3FFM, BEART, A5PIBGQJA, O2HGB, SOURC, JAG, VTAC, ACRATE, INSPIREDAIR, PEEP   No results found for: AMPMETHUR, BARBTUR, BDZUR, COCAINEUR, METHADONEUR, OPIATEUR, PCPUR, THCUR, OXYCODONEUR   Lactate INR     No results found for: LACTICACID   No results found for: INR   PTT Protime     No results found for: PTT   No results found for: PROTIME   Hepatitis HIV     No results found for: HEPBSAG, HEPCAB   No results found for: CPWTECI5YQK4, [id number]         Imaging Studies: I have personally reviewed pertinent reports  Counseling / Coordination of Care  Total floor / unit time spent today 75 minutes  Greater than 50% of total time was spent with the patient and / or family counseling and / or coordination of care  Minutes of critical care time [address]  -Critical care time was exclusive of separately billable procedures and teaching time    -Critical care was necessary to treat or prevent imminent or life-threatening deterioration of the following condition: CNS failure/compromise, toxidrome (opioid withdrawal), CNS failure/compromise and respiratory failure  -Critical care time was spent personally by me on the following activities as well as the above as per the course and rest of chart: obtaining history from patient/surrogate, development of a treatment plan, discussions with referring provider(s), evaluation of patient's response to the treatment, examination of the patient, recommending treatments and interventions, re-evaluation of the patient's condition, review of old charts, ordering/interpreting laboratory studies, ordering/interpreting of radiographic studies  ** Please Note: This note has been constructed using a voice recognition system   **

## 2022-02-02 NOTE — ASSESSMENT & PLAN NOTE
· Secondary to opioid-induced respiratory failure  · VBG revealed pH 7 11, pCO2 76  · Continue resp failure management as above  · Continue to monitor with supportive care

## 2022-02-02 NOTE — ASSESSMENT & PLAN NOTE
· Likely a stress reaction  · check an A1c as patient has not been to a doctor for a checkup in many years

## 2022-02-03 ENCOUNTER — APPOINTMENT (INPATIENT)
Dept: RADIOLOGY | Facility: HOSPITAL | Age: 47
DRG: 812 | End: 2022-02-03
Payer: MEDICAID

## 2022-02-03 PROBLEM — E87.6 HYPOKALEMIA: Status: RESOLVED | Noted: 2022-02-02 | Resolved: 2022-02-03

## 2022-02-03 PROBLEM — R73.03 PREDIABETES: Status: ACTIVE | Noted: 2022-02-02

## 2022-02-03 PROBLEM — M79.671 ACUTE FOOT PAIN, RIGHT: Status: ACTIVE | Noted: 2022-02-03

## 2022-02-03 LAB
ALBUMIN SERPL BCP-MCNC: 3.8 G/DL (ref 3–5.2)
ALP SERPL-CCNC: 60 U/L (ref 43–122)
ALT SERPL W P-5'-P-CCNC: 31 U/L
ANION GAP SERPL CALCULATED.3IONS-SCNC: 8 MMOL/L (ref 5–14)
ARTERIAL PATENCY WRIST A: NO
AST SERPL W P-5'-P-CCNC: 32 U/L (ref 17–59)
BASE EX.OXY STD BLDV CALC-SCNC: 94.3 %
BASE EXCESS BLDV CALC-SCNC: -0.6 MMOL/L (ref -2.1–2.1)
BILIRUB SERPL-MCNC: 0.51 MG/DL
BUN SERPL-MCNC: 8 MG/DL (ref 5–25)
CALCIUM SERPL-MCNC: 7.4 MG/DL (ref 8.4–10.2)
CHLORIDE SERPL-SCNC: 103 MMOL/L (ref 97–108)
CO2 SERPL-SCNC: 26 MMOL/L (ref 22–30)
CREAT SERPL-MCNC: 0.75 MG/DL (ref 0.7–1.5)
ERYTHROCYTE [DISTWIDTH] IN BLOOD BY AUTOMATED COUNT: 14.2 %
EST. AVERAGE GLUCOSE BLD GHB EST-MCNC: 120 MG/DL
GFR SERPL CREATININE-BSD FRML MDRD: 111 ML/MIN/1.73SQ M
GLUCOSE SERPL-MCNC: 113 MG/DL (ref 70–99)
HBA1C MFR BLD: 5.8 %
HCO3 BLDV-SCNC: 26.3 MMOL/L (ref 23–28)
HCT VFR BLD AUTO: 39.5 % (ref 41–53)
HGB BLD-MCNC: 13.4 G/DL (ref 13.5–17.5)
MAGNESIUM SERPL-MCNC: 1.6 MG/DL (ref 1.6–2.3)
MCH RBC QN AUTO: 30.8 PG (ref 26–34)
MCHC RBC AUTO-ENTMCNC: 33.9 G/DL (ref 31–36)
MCV RBC AUTO: 91 FL (ref 80–100)
O2 CT BLDV-SCNC: 18.9 ML/DL
PCO2 BLDV: 51 MM HG (ref 41–51)
PH BLDV: 7.32 [PH] (ref 7.35–7.45)
PLATELET # BLD AUTO: 182 THOUSANDS/UL (ref 150–450)
PMV BLD AUTO: 9.1 FL (ref 8.9–12.7)
PO2 BLDV: 89 MM HG
POTASSIUM SERPL-SCNC: 4.3 MMOL/L (ref 3.6–5)
PROT SERPL-MCNC: 6.8 G/DL (ref 5.9–8.4)
RBC # BLD AUTO: 4.35 MILLION/UL (ref 4.5–5.9)
SODIUM SERPL-SCNC: 137 MMOL/L (ref 137–147)
WBC # BLD AUTO: 9.4 THOUSAND/UL (ref 4.5–11)

## 2022-02-03 PROCEDURE — 83735 ASSAY OF MAGNESIUM: CPT | Performed by: PHYSICIAN ASSISTANT

## 2022-02-03 PROCEDURE — 73630 X-RAY EXAM OF FOOT: CPT

## 2022-02-03 PROCEDURE — 82805 BLOOD GASES W/O2 SATURATION: CPT | Performed by: PHYSICIAN ASSISTANT

## 2022-02-03 PROCEDURE — 71045 X-RAY EXAM CHEST 1 VIEW: CPT

## 2022-02-03 PROCEDURE — 99233 SBSQ HOSP IP/OBS HIGH 50: CPT | Performed by: PHYSICIAN ASSISTANT

## 2022-02-03 PROCEDURE — 80053 COMPREHEN METABOLIC PANEL: CPT | Performed by: PHYSICIAN ASSISTANT

## 2022-02-03 PROCEDURE — NC001 PR NO CHARGE: Performed by: PHYSICIAN ASSISTANT

## 2022-02-03 PROCEDURE — 82948 REAGENT STRIP/BLOOD GLUCOSE: CPT

## 2022-02-03 PROCEDURE — 85027 COMPLETE CBC AUTOMATED: CPT | Performed by: PHYSICIAN ASSISTANT

## 2022-02-03 RX ORDER — CALCIUM CARBONATE 500(1250)
2 TABLET ORAL ONCE
Status: COMPLETED | OUTPATIENT
Start: 2022-02-03 | End: 2022-02-03

## 2022-02-03 RX ORDER — ALBUTEROL SULFATE 90 UG/1
2 AEROSOL, METERED RESPIRATORY (INHALATION) EVERY 4 HOURS PRN
Status: DISCONTINUED | OUTPATIENT
Start: 2022-02-03 | End: 2022-02-05 | Stop reason: HOSPADM

## 2022-02-03 RX ADMIN — ONDANSETRON 4 MG: 2 INJECTION INTRAMUSCULAR; INTRAVENOUS at 15:58

## 2022-02-03 RX ADMIN — ONDANSETRON 4 MG: 2 INJECTION INTRAMUSCULAR; INTRAVENOUS at 00:42

## 2022-02-03 RX ADMIN — NALOXONE HYDROCHLORIDE 4 MG/HR: 1 INJECTION PARENTERAL at 01:16

## 2022-02-03 RX ADMIN — CALCIUM 2 TABLET: 500 TABLET ORAL at 08:43

## 2022-02-03 RX ADMIN — ONDANSETRON 4 MG: 2 INJECTION INTRAMUSCULAR; INTRAVENOUS at 08:14

## 2022-02-03 NOTE — DISCHARGE INSTR - OTHER ORDERS
Drug and Alcohol Resources in [facility]    If you have health insurance, including medical assistance, there should be a phone number on your insurance card that you can call to find out how to access services  The card may say, For Bhavin Galiciaseng Dianazachary or For Drug and Alcohol Services or For Substance Abuse Services call the number provided  Or contact [phone]    [facility] ([facility])  [facility] provides care management for adults with Opioid Use Disorder  [facility] has a team of care managers who will help individuals get into treatment, coordinate behavioral and physical health care, and provide recovery support and guidance  Care managers will also provide information about Medication-Assisted Treatment  Contact [phone]    [facility] Drug and Alcohol Administration [phone] For additional information, contact the Department of Human Services Information and Referral Unit at [phone] between the hours of 8:30 a m  and 4:30 p m , Monday through Friday  Recovery Centers  These centers offer a safe, sober environment to those in recovery  A variety of programming including 12-Step Meetings, Geroldine Barefoot, Life Skills Workshops, etc  is offered at each location  Recovery Centers  These centers offer a safe, sober environment to those in recovery  A variety of programming including 12-Step Meetings, Geroldine Barefoot, Life Skills Workshops, etc  is offered at each location  [address] [facility], [address] [place] [address]  [phone]  [email or web address]  [email or web address] [address]  [place], [address] [facility]  [phone]  [email or web address]    [address] [facility]  [phone]   [address] [place] [address]  [phone]  www  [email or web address], [address] [place] [address]  [phone]  [facility]  JOHAN POWELL [facility] is [phone], [place]  Latoya Felizamrit Rociodon Car[place], Thursday from 1pm-5pm and Friday, Saturday from 11am-3pm    [address] [facility]  Confidential free help, from public health agencies, to find substance use treatment and information  [phone]    Link for Zoom Codes for Virtual 12 step Meetings Kay baugh  aspx    AA [facility]  If you feel you have a problem with alcohol, or if you simply want to know more about AA, call our 24-hour hotline at: [address]: [address] Meetings can be found at [email or web address] [email or web address]  CARLOS Meeting list [email or web address]

## 2022-02-03 NOTE — PLAN OF CARE
Problem: Potential for Falls  Goal: Patient will remain free of falls  Description: INTERVENTIONS:  - Educate patient/family on patient safety including physical limitations  - Instruct patient to call for assistance with activity   - Consult OT/PT to assist with strengthening/mobility   - Keep Call bell within reach  - Keep bed low and locked with side rails adjusted as appropriate  - Keep care items and personal belongings within reach  - Initiate and maintain comfort rounds  - Make Fall Risk Sign visible to staff  - Offer Toileting every 2 Hours, in advance of need  - Apply yellow socks and bracelet for high fall risk patients  - Consider moving patient to room near nurses station  2/3/2022 0717 by Jay Adkins RN  Outcome: Progressing  2/2/2022 1927 by Jay Adkins RN  Outcome: Progressing     Problem: MOBILITY - ADULT  Goal: Maintain or return to baseline ADL function  Description: INTERVENTIONS:  -  Assess patient's ability to carry out ADLs; assess patient's baseline for ADL function and identify physical deficits which impact ability to perform ADLs (bathing, care of mouth/teeth, toileting, grooming, dressing, etc )  - Assess/evaluate cause of self-care deficits   - Assess range of motion  - Assess patient's mobility; develop plan if impaired  - Assess patient's need for assistive devices and provide as appropriate  - Encourage maximum independence but intervene and supervise when necessary  - Involve family in performance of ADLs  - Assess for home care needs following discharge   - Consider OT consult to assist with ADL evaluation and planning for discharge  - Provide patient education as appropriate  2/3/2022 0717 by Jay Adkins RN  Outcome: Progressing  2/2/2022 1927 by Jay Adkins RN  Outcome: Progressing  Goal: Maintains/Returns to pre admission functional level  Description: INTERVENTIONS:  - Perform BMAT or MOVE assessment daily    - Set and communicate daily mobility goal to care team and patient/family/caregiver  - Collaborate with rehabilitation services on mobility goals if consulted  - Perform Range of Motion 10 times a day  - Reposition patient every 2 hours    - Dangle patient 5 times a day  - Stand patient 5 times a day  - Ambulate patient 5 times a day  - Out of bed to chair 3 times a day   - Out of bed for meals 3 times a day  - Out of bed for toileting  - Record patient progress and toleration of activity level   2/3/2022 0717 by Leah Hinojosa RN  Outcome: Progressing  2/2/2022 1927 by Leah Hinojosa RN  Outcome: Progressing     Problem: COPING  Goal: Pt/Family able to verbalize concerns and demonstrate effective coping strategies  Description: INTERVENTIONS:  - Assist patient/family to identify coping skills, available support systems and cultural and spiritual values  - Provide emotional support, including active listening and acknowledgement of concerns of patient and caregivers  - Reduce environmental stimuli, as able  - Provide patient education  - Assess for spiritual pain/suffering and initiate spiritual care, including notification of Pastoral Care or [facility] community as needed  - Assess effectiveness of coping strategies  2/3/2022 0717 by Leah Hinojosa RN  Outcome: Progressing  2/2/2022 1927 by Leah Hinojosa RN  Outcome: Progressing  Goal: Will report anxiety at manageable levels  Description: INTERVENTIONS:  - Administer medication as ordered  - Teach and encourage coping skills  - Provide emotional support  - Assess patient/family for anxiety and ability to cope  2/3/2022 0717 by Leah Hinojosa RN  Outcome: Progressing  2/2/2022 1927 by Leah Hinojosa RN  Outcome: Progressing     Problem: SUBSTANCE USE/ABUSE  Goal: Will have no detox symptoms and will verbalize plan for changing substance-related behavior  Description: INTERVENTIONS:  - Monitor physical status and assess for symptoms of withdrawal  - Administer medication as ordered  - Provide emotional support with 1 on 1 interaction with staff  - Encourage recovery focused program/ addiction education  - Assess for verbalization of changing behaviors related to substance abuse  - Initiate consults and referrals as appropriate (Case Management, Spiritual Care, etc )  2/3/2022 0717 by Vidal Goodell, RN  Outcome: Progressing  2/2/2022 1927 by Vidal Goodell, RN  Outcome: Progressing  Goal: By discharge, will develop insight into their chemical dependency and sustain motivation to continue in recovery  Description: INTERVENTIONS:  - Attends all daily group sessions and scheduled AA groups  - Actively practices coping skills through participation in the therapeutic community and adherence to program rules  - Reviews and completes assignments from individual treatment plan  - Assist patient development of understanding of their personal cycle of addiction and relapse triggers  2/3/2022 0717 by Vidal Goodell, RN  Outcome: Progressing  2/2/2022 1927 by Vidal Goodell, RN  Outcome: Progressing  Goal: By discharge, patient will have ongoing treatment plan addressing chemical dependency  Description: INTERVENTIONS:  - Assist patient with resources and/or appointments for ongoing recovery based living  2/3/2022 0717 by Vidal Goodell, RN  Outcome: Progressing  2/2/2022 1927 by Vidal Goodell, RN  Outcome: Progressing     Problem: NEUROSENSORY - ADULT  Goal: Achieves stable or improved neurological status  Description: INTERVENTIONS  - Monitor and report changes in neurological status  - Monitor vital signs such as temperature, blood pressure, glucose, and any other labs ordered   - Initiate measures to prevent increased intracranial pressure  - Monitor for seizure activity and implement precautions if appropriate      2/3/2022 0717 by Vidal Goodell, RN  Outcome: Progressing  2/2/2022 1927 by Vidal Goodell, RN  Outcome: Progressing  Goal: Remains free of injury related to seizures activity  Description: INTERVENTIONS  - Maintain airway, patient safety  and administer oxygen as ordered  - Monitor patient for seizure activity, document and report duration and description of seizure to physician/advanced practitioner  - If seizure occurs,  ensure patient safety during seizure  - Reorient patient post seizure  - Seizure pads on all 4 side rails  - Instruct patient/family to notify RN of any seizure activity including if an aura is experienced  - Instruct patient/family to call for assistance with activity based on nursing assessment  - Administer anti-seizure medications if ordered    2/3/2022 0717 by Sd Disla RN  Outcome: Progressing  2/2/2022 1927 by Sd Disla RN  Outcome: Progressing  Goal: Achieves maximal functionality and self care  Description: INTERVENTIONS  - Monitor swallowing and airway patency with patient fatigue and changes in neurological status  - Encourage and assist patient to increase activity and self care     - Encourage visually impaired, hearing impaired and aphasic patients to use assistive/communication devices  2/3/2022 0717 by Sd Disla RN  Outcome: Progressing  2/2/2022 1927 by Sd Disla RN  Outcome: Progressing     Problem: RESPIRATORY - ADULT  Goal: Achieves optimal ventilation and oxygenation  Description: INTERVENTIONS:  - Assess for changes in respiratory status  - Assess for changes in mentation and behavior  - Position to facilitate oxygenation and minimize respiratory effort  - Oxygen administered by appropriate delivery if ordered  - Initiate smoking cessation education as indicated  - Encourage broncho-pulmonary hygiene including cough, deep breathe, Incentive Spirometry  - Assess the need for suctioning and aspirate as needed  - Assess and instruct to report SOB or any respiratory difficulty  - Respiratory Therapy support as indicated  2/3/2022 0717 by Timoteo Mckenzie RN  Outcome: Progressing  2/2/2022 1927 by Timoteo Mckenzie RN  Outcome: Progressing     Problem: GASTROINTESTINAL - ADULT  Goal: Minimal or absence of nausea and/or vomiting  Description: INTERVENTIONS:  - Administer IV fluids if ordered to ensure adequate hydration  - Maintain NPO status until nausea and vomiting are resolved  - Nasogastric tube if ordered  - Administer ordered antiemetic medications as needed  - Provide nonpharmacologic comfort measures as appropriate  - Advance diet as tolerated, if ordered  - Consider nutrition services referral to assist patient with adequate nutrition and appropriate food choices  2/3/2022 0717 by Timoteo Mckenzie RN  Outcome: Progressing  2/2/2022 1927 by Timoteo Mckenzie RN  Outcome: Progressing  Goal: Maintains adequate nutritional intake  Description: INTERVENTIONS:  - Monitor percentage of each meal consumed  - Identify factors contributing to decreased intake, treat as appropriate  - Assist with meals as needed  - Monitor I&O, weight, and lab values if indicated  - Obtain nutrition services referral as needed  2/3/2022 0717 by Timoteo Mckenzie RN  Outcome: Progressing  2/2/2022 1927 by Timoteo Mckenzie RN  Outcome: Progressing     Problem: METABOLIC, FLUID AND ELECTROLYTES - ADULT  Goal: Electrolytes maintained within normal limits  Description: INTERVENTIONS:  - Monitor labs and assess patient for signs and symptoms of electrolyte imbalances  - Administer electrolyte replacement as ordered  - Monitor response to electrolyte replacements, including repeat lab results as appropriate  - Instruct patient on fluid and nutrition as appropriate  2/3/2022 0717 by Timoteo Mckenzie RN  Outcome: Progressing  2/2/2022 1927 by Timoteo Mckenzie RN  Outcome: Progressing     Problem: SKIN/TISSUE INTEGRITY - ADULT  Goal: Skin Integrity remains intact(Skin Breakdown Prevention)  Description: Assess:  -Perform Donal assessment every shift  -Clean and moisturize skin every 2 hours  -Inspect skin when repositioning, toileting, and assisting with ADLS  -Assess extremities for adequate circulation and sensation     Bed Management:  -Have minimal linens on bed & keep smooth, unwrinkled  -Change linens as needed when moist or perspiring  -Avoid sitting or lying in one position for more than 2 hours while in bed  -Keep HOB at 30 degrees     Toileting:  -Offer bedside commode  -Assess for incontinence every 2 hours    Activity:  -Mobilize patient 10 times a day  -Encourage activity and walks on unit  -Encourage or provide ROM exercises   -Turn and reposition patient every 2 Hours  -Use appropriate equipment to lift or move patient in bed  -Instruct/ Assist with weight shifting every 2 hours when out of bed in chair  -Consider limitation of chair time 2 hour intervals    Skin Care:  -Avoid use of baby powder, tape, friction and shearing, hot water or constrictive clothing  -Relieve pressure over bony prominences using pillows  -Do not massage red bony areas    2/3/2022 0717 by Kely Ordonez RN  Outcome: Progressing  2/2/2022 1927 by Kely Ordonez RN  Outcome: Progressing     Problem: Prexisting or High Potential for Compromised Skin Integrity  Goal: Skin integrity is maintained or improved  Description: INTERVENTIONS:  - Identify patients at risk for skin breakdown  - Assess and monitor skin integrity  - Assess and monitor nutrition and hydration status  - Monitor labs   - Assess for incontinence   - Turn and reposition patient  - Assist with mobility/ambulation  - Relieve pressure over bony prominences  - Avoid friction and shearing  - Provide appropriate hygiene as needed including keeping skin clean and dry  - Evaluate need for skin moisturizer/barrier cream  - Collaborate with interdisciplinary team   - Patient/family teaching  - Consider wound care consult   Outcome: Progressing

## 2022-02-03 NOTE — ASSESSMENT & PLAN NOTE
· Patient reports intermittent cocaine abuse but denies opioid abuse     · Reports he occasionally snorts cocaine every 3-4 months   · States he took 2 snorts of what he thought was cocaine earlier today   · Consult case management for further coordination of care- patient not interested in substance use treatment at this time

## 2022-02-03 NOTE — NURSING NOTE
Narcan stopped  Patient alert and awake  He is oriented x4  States he feels more awake and feels overall better  No nausea or other concerns at this time  Oxygen removed and patient O2 saturation WNL  This nurse was able to perform a bedside swallow eval which the patient passed  He expresses concern about no having insurance while here at the hospital   Will monitor closely for signs of respiratory depression

## 2022-02-03 NOTE — NURSING NOTE
Patient resting comfortably on 2L NC  Patient only complaint at this time is when he wakes up he feels "dizzy"  AP already aware  RN will continue to monitor and give emotional support

## 2022-02-03 NOTE — UTILIZATION REVIEW
Initial Clinical Review    Admission: Date/Time/Statement:   Admission Orders (From admission, onward)     Ordered        02/02/22 1611  Inpatient Admission  Once                      Orders Placed This Encounter   Procedures    Inpatient Admission     Standing Status:   Standing     Number of Occurrences:   1     Order Specific Question:   Level of Care     Answer:   Level 2 Stepdown / HOT [14]     Order Specific Question:   Estimated length of stay     Answer:   More than 2 Midnights     Order Specific Question:   Certification     Answer:   I certify that inpatient services are medically necessary for this patient for a duration of greater than two midnights  See H&P and MD Progress Notes for additional information about the patient's course of treatment  ED Arrival Information     Expected Arrival Acuity    - 2/2/2022 15:21 Emergent         Means of arrival Escorted by Service Admission type    [facility] ([address]) Medical Toxicology Emergency         Arrival complaint    overdose         Chief Complaint   Patient presents with    Overdose - Accidental     Pt found at a garage unresponsive after using a white substance nasally  Carmen Bell by EMS       Initial Presentation: 42 y o  male who presented by EMS to [address] [place] [address],[address] [facility]  Inpatient admission for evaluation and treatment of accidental opioid overdose, opioid-induced acute respiratory failure w/ hypoxia and hypercapnia, respiratory acidosis, substance use disorder, electrolyte abnormalities  PMHx: cocaine abuse, intermittent alcohol use disorder  Presented after being found unresponsive in running car after reportedly snorting a white substance he believed to be cocaine  Received 0 8 mg IV narcan en route  On arrival to ED, pt apneic w/ s/s of opioid toxicity  He receieved additional doses of narcan in ED and narcan gtt was started   Pt's wife reports pt does not use illicit substances, and he was consuming alcohol today but does not drink daily  Pt lethargic, requiring sternal rub for arousal  B/L pinpoint pupils w/ sluggish minimal response, bradypnea and respiratory distress, 1 episode of nonbloody emesis, abrasion on R foot  Pt reports using cocaine every 3-4 months via insufflation  VBG pH 7 11, pCO2 76  Ethanol 95  Plan trend troponins, trend labs & replete electrolytes prn, IV narcan gtt, continuous pulse ox, telemetry         ED Triage Vitals   Temperature Pulse Respirations Blood Pressure SpO2   02/02/22 1700 02/02/22 1524 02/02/22 1524 02/02/22 1524 02/02/22 1524   (!) 97 4 °F (36 3 °C) 94 12 138/71 100 %      Temp Source Heart Rate Source Patient Position - Orthostatic VS BP Location FiO2 (%)   02/02/22 1700 02/02/22 1524 02/02/22 1524 02/02/22 1524 --   Oral Monitor Lying Left arm       Pain Score       02/02/22 1600       No Pain          Wt Readings from Last 1 Encounters:   02/02/22 82 1 kg (181 lb)     Additional Vital Signs:   Date/Time Temp Pulse Resp BP MAP (mmHg) SpO2 Calculated FIO2 (%) - Nasal Cannula Nasal Cannula O2 Flow Rate O2 Device   02/03/22 1133 -- -- -- -- -- 99 % 28 2 L/min Nasal cannula   02/03/22 1100 97 8 °F (36 6 °C) 67 16 129/74 92 95 % 28 2 L/min Nasal cannula   02/03/22 0931 -- 66 12 124/87 99 94 % 28 2 L/min Nasal cannula   02/03/22 0719 99 1 °F (37 3 °C) 91 14 124/79 -- 96 % -- -- None (Room air)   02/03/22 0631 -- -- 14 -- -- -- -- -- --   02/03/22 0537 97 9 °F (36 6 °C) 70 16 120/69 -- 96 % -- -- None (Room air)   02/03/22 0449 98 2 °F (36 8 °C) 90 18 122/76 -- 100 % -- -- None (Room air)   02/03/22 0300 97 6 °F (36 4 °C) 96 16 122/75 -- 100 % 28 2 L/min --   02/03/22 0023 97 9 °F (36 6 °C) 88 16 126/79 -- 100 % 28 2 L/min None (Room air)   02/02/22 2300 97 9 °F (36 6 °C) 92 16 126/81 -- 100 % 28 2 L/min None (Room air)   02/02/22 2208 97 6 °F (36 4 °C) 93 18 126/70 -- 100 % 28 2 L/min None (Room air)   02/02/22 2030 98 6 °F (37 °C) 89 16 121/87 -- 100 % 28 2 L/min Nasal cannula 02/02/22 1900 -- 73 16 129/84 -- 100 % -- -- --   02/02/22 1835 -- 85 20 -- -- 95 % 28 2 L/min Nasal cannula   02/02/22 1800 97 2 °F (36 2 °C)  78 10 Abnormal  128/82 97 100 % -- -- None (Room air)   02/02/22 1715 -- 87 6 Abnormal  118/73 88 100 % 28 2 L/min Nasal cannula   02/02/22 1700 97 4 °F (36 3 °C)  71 8 Abnormal  126/70 88 95 % 28 2 L/min Nasal cannula   02/02/22 1630 -- 69 7 Abnormal  109/73 85 100 % 28 2 L/min None (Room air)   02/02/22 1600 -- 90 12 125/70 88 100 % 28 2 L/min Nasal cannula     Pertinent Labs/Diagnostic Test Results:   2/2 - CXR   1  No acute cardiopulmonary disease  2   Partially imaged air-filled dilated loops of bowel overlying the left upper abdomen, dedicated imaging of the abdomen could further evaluate if clinically indicated  2/2 - EKG  Normal sinus rhythm  Minimal voltage criteria for LVH, may be normal variant  Prolonged QT    2/2 - CT head  No acute intracranial abnormality  2/3 - XR R foot  No acute osseous abnormality  2/3 - CXR  No acute cardiopulmonary disease      Results from last 7 days   Lab Units 02/02/22  1608   SARS-COV-2  Negative     Results from last 7 days   Lab Units 02/03/22  0454 02/02/22  1530   WBC Thousand/uL 9 40 8 80   HEMOGLOBIN g/dL 13 4* 14 3   HEMATOCRIT % 39 5* 42 8   PLATELETS Thousands/uL 182 231   TOTAL NEUT ABS Thousand/uL  --  3 87   BANDS PCT %  --  1     Results from last 7 days   Lab Units 02/03/22  0454 02/02/22  1530   SODIUM mmol/L 137 141   POTASSIUM mmol/L 4 3 2 7*   CHLORIDE mmol/L 103 104   CO2 mmol/L 26 25   ANION GAP mmol/L 8 12   BUN mg/dL 8 11   CREATININE mg/dL 0 75 0 90   EGFR ml/min/1 73sq m 111 103   CALCIUM mg/dL 7 4* 8 3*   MAGNESIUM mg/dL 1 6 2 5*     Results from last 7 days   Lab Units 02/03/22  0454 02/02/22  1530   AST U/L 32 41   ALT U/L 31 37   ALK PHOS U/L 60 66   TOTAL PROTEIN g/dL 6 8 7 8   ALBUMIN g/dL 3 8 4 5   TOTAL BILIRUBIN mg/dL 0 51 0 57     Results from last 7 days   Lab Units 02/03/22  3881 02/02/22  1530   GLUCOSE RANDOM mg/dL 113* 195*     Results from last 7 days   Lab Units 02/02/22  1845   HEMOGLOBIN A1C % 5 8*   EAG mg/dl 120     Results from last 7 days   Lab Units 02/03/22  0454 02/02/22  1530   PH LIVIA  7 320* 7 110*   PCO2 LIVIA mm Hg 51 0 76 0*   PO2 LIVIA mm Hg 89 0 48 0   HCO3 LIVIA mmol/L 26 3 24 1   BASE EXC LIVIA mmol/L -0 6 -7 2*   O2 CONTENT LIVIA ml/dL 18 9 14 6   O2 HGB, VENOUS % 94 3 69 1*     Results from last 7 days   Lab Units 02/02/22  1845 02/02/22  1530   HS TNI 0HR ng/L  --  4   HS TNI 4HR ng/L 22  --    HSTNI D4 ng/L 18  --      Results from last 7 days   Lab Units 02/02/22  1608   INFLUENZA A PCR  Negative   INFLUENZA B PCR  Negative   RSV PCR  Negative     Results from last 7 days   Lab Units 02/02/22  1920   AMPH/METH  Negative   BARBITURATE UR  Negative   BENZODIAZEPINE UR  Negative   COCAINE UR  Negative   METHADONE URINE  Negative   OPIATE UR  Negative   PCP UR  Negative   THC UR  Negative     Results from last 7 days   Lab Units 02/02/22  1611 02/02/22  1606   ETHANOL LVL mg/dL 95*  --    ACETAMINOPHEN LVL ug/mL  --  <70*   SALICYLATE LVL mg/dL  --  <1 0*     Results from last 7 days   Lab Units 02/02/22  1530   TOTAL COUNTED  100         ED Treatment:   Medication Administration from 02/02/2022 1521 to 02/02/2022 1738       Date/Time Order Dose Route Action     02/02/2022 1532 sodium chloride 0 9 % bolus 1,000 mL 1,000 mL Intravenous New Bag     02/02/2022 1526 naloxone (NARCAN) injection 2 mg 2 mg Intravenous Given     02/02/2022 1724 naloxone (NARCAN) 2 mg in sodium chloride 0 9 % 500 mL infusion 2 5 mg/hr Intravenous Rate/Dose Change     02/02/2022 1700 naloxone (NARCAN) 2 mg in sodium chloride 0 9 % 500 mL infusion 2 mg/hr Intravenous New Bag     02/02/2022 1617 naloxone (NARCAN) 2 mg in sodium chloride 0 9 % 500 mL infusion 2 mg/hr Intravenous Rate/Dose Change     02/02/2022 1616 naloxone (NARCAN) 2 mg in sodium chloride 0 9 % 500 mL infusion 2 mg/hr Intravenous Rate/Dose Change     02/02/2022 1604 naloxone (NARCAN) 2 mg in sodium chloride 0 9 % 500 mL infusion 1 5 mg/hr Intravenous Rate/Dose Change     02/02/2022 1540 naloxone (NARCAN) 2 mg in sodium chloride 0 9 % 500 mL infusion 1 mg/hr Intravenous New Bag     02/02/2022 1705 potassium chloride 20 mEq IVPB (premix) 20 mEq Intravenous New Bag     02/02/2022 1622 naloxone (NARCAN) injection 1 mg 1 mg Intravenous Given     02/02/2022 1703 ondansetron (ZOFRAN) injection 4 mg 4 mg Intravenous Given     02/02/2022 1724 naloxone (NARCAN) injection 1 mg 1 mg Intravenous Given        History reviewed  No pertinent past medical history  Present on Admission:   Acute respiratory failure with hypoxia and hypercapnia (HCC)   Respiratory acidosis   Substance use disorder   (Resolved) Hypokalemia   Hypocalcemia   Prediabetes   Alcohol use   Acute foot pain, right      Admitting Diagnosis: Acute respiratory failure (HCC) [J96 00]  Hypokalemia [E87 6]  Overdose [T50 901A]  Alcohol use [Z72 89]  Opioid overdose ([facility] [address] ) [T40 2X1A]  Cocaine use [F14 90]  Age/Sex: 42 y o  male  Admission Orders:  Regular Diet  Bed rest   Telemetry  Continuous Pulse Ox  SCDs  Scheduled Medications:  enoxaparin, 40 mg, Subcutaneous, Daily    Continuous IV Infusions:  naloxone (NARCAN) 500 mL infusion, 4 mg/hr, Intravenous, Continuous; Start: 02/02/22 1930, Stopped: 02/03/22 0455    PRN Meds:  calcium carbonate, 1000 mg, Oral, 2/3 x1  ondansetron, 4 mg, Intravenous, Q6H PRN; 2/3 x2  narcan, 4 mg, Intravenous, Once; 2/2 x1  potassium chloride, 20 mEq, Intravenous; 2/2 x1      IP CONSULT TO CASE MANAGEMENT    Network Utilization Review Department  ATTENTION: Please call with any questions or concerns to [phone] and carefully listen to the prompts so that you are directed to the right person   All voicemails are confidential   Michaelle Skipper all requests for admission clinical reviews, approved or denied determinations and any other requests to dedicated fax number below belonging to the campus where the patient is receiving treatment   List of dedicated fax numbers for the Facilities:  [address] [place] [address] DENIALS (Administrative/Medical Necessity) [phone]   [address]  (Maternity/NICU/Pediatrics) [phone]   [address]  [address] [facility] [place] [address] [place] [id number] [place] [address] [address] [place] [address] [phone]

## 2022-02-03 NOTE — ASSESSMENT & PLAN NOTE
· Reports mild dull right foot discomfort with weight-bearing/ambulation x 2 days since jumping off truck at work  · TTP of right 5th metatarsal and overlying ecchymosis noted on exam   · Order XR right foot- no acute fracture

## 2022-02-03 NOTE — NURSING NOTE
Patient's wife dropped off belongings for patient  Extra shirt and shorts  There was also homemade soup that RN deemed appropriate and let Patient have at bedside  Will continue to monitor and give emotional support

## 2022-02-03 NOTE — ASSESSMENT & PLAN NOTE
· hgb A1C 2/2/2022 5 8  · Educated patient regarding prediabetes  · Encourage healthy diet, exercise    · Recommend outpatient follow-up with PCP

## 2022-02-03 NOTE — NURSING NOTE
RN gave patient Zofran earlier for Nausea  RN later came to give two calcium pills to patient  Patient took successfully but soon after started to throw up emesis and one of the calcium pills  AP at bedside evaluating and rounding with patient and

## 2022-02-03 NOTE — ASSESSMENT & PLAN NOTE
Patient with h/o occasional alcohol use  · Reports he currently consumes 5-6 beers weekly/bi-weekly  · Reports he consumed 7 beers yesterday   Pertinent labs:  Ethanol 95 (2/22/2022 1611 pm)  Given reported infrequent use of alcohol, do not suspect severe alcohol withdrawal to occur, however will monitor during admission  Encourage cessation  Consult case management for assistance with rehab resource

## 2022-02-03 NOTE — PROGRESS NOTES
[facility]  Progress Note Michelle Clark 1975, 42 y o  male MRN: [id number]  Unit/Bed#: 5T DETOX 501-01 Encounter: [id number]  Primary Care Provider: No primary care provider on file  Date and time admitted to hospital: 2/2/2022  3:21 PM    Progress Note - Medical Toxicology    Reyna Stubbsencia 42 y o  male MRN: [id number]  Unit/Bed#: 5T DETOX 501-01 Encounter: [id number]  [address] [place] [address] 4  Department of Medical Toxicology  Reason for Admission/Principal Problem: acute respiratory failure 2/2 suspected opioid overdose   Rounding Provider: Ada Tamayo PA-C, Seble Martinez, DO     * Acute respiratory failure with hypoxia and hypercapnia ([facility] [address] )  Assessment & Plan  · Secondary to accidental opioid overdose on 2/2/2022    · Patient maintains that he is opioid naive and that he thought he was snorting cocaine  · CXR in ED 2/2/2022- no acute cardiopulmonary disease  · CT head 2/2/2022 negative   · Initial HS trop 4, 4 hr trop 22 (delta 18)  · Patient was started on narcan drip in ED- required titration up to 4 mg/hr  · Narcan drip stopped 0455 this morning  · Respiratory acidosis improving   · Continue to monitor vitals/symptoms- continuous pulse ox revealed occasionally desaturation down to mid-to-low 80s on room air   · Place on 2L O2 via NC to maintain SpO2 >90%  · Will repeat CXR this AM as patient has had multiple episodes of vomiting during admission and occasionally desaturates     Respiratory acidosis  Assessment & Plan  · Secondary to opioid-induced respiratory failure  · Initial VBG revealed pH 7 11, pCO2 76  · VBG this AM revealed improving respiratory acidosis: pH up to 7 320, pCO2 51  · Continue resp failure management as above  · Continue to monitor with supportive care  Substance use disorder  Assessment & Plan  · Patient reports intermittent cocaine abuse but denies opioid abuse     · Reports he occasionally snorts cocaine every 3-4 months   · States he took 2 snorts of what he thought was cocaine earlier today   · Consult case management for further coordination of care- patient not interested in substance use treatment at this time    Alcohol use  Assessment & Plan  Patient with h/o occasional alcohol use  · Reports he currently consumes 5-6 beers weekly/bi-weekly  · Reports he consumed 7 beers yesterday   Pertinent labs:  Ethanol 95 (2/22/2022 1611 pm)  Given reported infrequent use of alcohol, do not suspect severe alcohol withdrawal to occur, however will monitor during admission  Encourage cessation  Consult case management for assistance with rehab resource    Acute foot pain, right  Assessment & Plan  · Reports mild dull right foot discomfort with weight-bearing/ambulation x 2 days since jumping off truck at work  · TTP of right 5th metatarsal and overlying ecchymosis noted on exam   · Order XR right foot- no acute fracture     Prediabetes  Assessment & Plan  · hgb A1C 2/2/2022 5 8  · Educated patient regarding prediabetes  · Encourage healthy diet, exercise  · Recommend outpatient follow-up with PCP     Hypocalcemia  Assessment & Plan  · Continue to optimize electrolytes    Hypokalemia-resolved as of 2/3/2022  Assessment & Plan  · CMP 2/2/2022 revealed K+ 2 7  magnesium 2 5  · Supplementation administered  · Potassium improved to 4 3 this morning  Magnesium 1 6  · Continue to monitor and optimize electrolytes  VTE Pharmacologic Prophylaxis:   Pharmacologic: Enoxaparin (Lovenox)  Mechanical VTE Prophylaxis in Place: yes    Code Status: Level 1 - Full Code    Patient Centered Rounds: I have performed bedside rounds with nursing staff today  Discussions with Specialists or Other Care Team Provider: Dr Robert Kiser   Education and Discussions with Family / Patient: discussed current plan with patient, answered all questions to best of my ability     Time Spent for Care: 30 minutes    More than 50% of total time spent on counseling and coordination of care as described above  Current Length of Stay: 1 day(s)    Current Patient Status: Inpatient     Certification Statement: The patient will continue to require additional inpatient hospital stay due to ongoing monitoring of respiratory status following recent overdose, monitoring of resp acidosis    Discharge Plan: potential discharge to home once medically stable       Total Critical Care time spent 20 minutes excluding procedures, teaching and family updates  **PLEASE NOTE- Jarvis  assisted with Chinese translation during encounter    Subjective:   Patient seen and examined at bedside this morning  Patient reports he currently feels tired, reports mild headache and dizziness, notes minimal appetite and reports intermittent nausea  Currently denies chest pain, SOB/dyspnea, abdominal pain, diarrhea/constipation  Patient maintains that he only uses cocaine 3-4 times annually and that he typically only drinks 5-6 beers on the weekend  Also confirms that he consumed 7 beers yesterday prior to overdose  Patient also reports that he injured his right foot at work 2 days ago  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  Patient states he has some dull discomfort with weight-bearing and ambulation, however is nonetheless able to ambulate  Denies pain at rest, denies numbness/tingling  Reports he missed work yesterday due to injury       Objective:     Clinical Opiate Withdrawal Scale  Pulse: 67    No data recorded      Last 24 Hours Medication List:   Current Facility-Administered Medications   Medication Dose Route Frequency Provider Last Rate    enoxaparin  40 mg Subcutaneous Daily Joanne Tamayo PA-C      naloxone (NARCAN) 500 mL infusion  4 mg/hr Intravenous Continuous Good Nickel Nappe, DO Stopped (22 [id number])    ondansetron  4 mg Intravenous Q6H PRN Good Nickel Nappe, DO           Vitals:   Temp (24hrs), Av 9 °F (36 6 °C), Bactrim Counseling:  I discussed with the patient the risks of sulfa antibiotics including but not limited to GI upset, allergic reaction, drug rash, diarrhea, dizziness, photosensitivity, and yeast infections.  Rarely, more serious reactions can occur including but not limited to aplastic anemia, agranulocytosis, methemoglobinemia, blood dyscrasias, liver or kidney failure, lung infiltrates or desquamative/blistering drug rashes. Min:97 2 °F (36 2 °C), Max:99 1 °F (37 3 °C)    Temp:  [97 2 °F (36 2 °C)-99 1 °F (37 3 °C)] 99 1 °F (37 3 °C)  HR:  [66-96] 67  Resp:  [6-20] 16  BP: (109-138)/(69-87) 124/87  SpO2:  [94 %-100 %] 95 %  Body mass index is 29 21 kg/m²  Input and Output Summary (last 24 hours): Intake/Output Summary (Last 24 hours) at 2/3/2022 1109  Last data filed at 2/3/2022 0455  Gross per 24 hour   Intake 3600 ml   Output 3425 ml   Net 175 ml       Physical Exam:   Physical Exam  Vitals and nursing note reviewed  Constitutional:       General: He is not in acute distress  Appearance: He is well-developed  He is obese  He is ill-appearing and diaphoretic  HENT:      Head: Normocephalic and atraumatic  Nose: Nose normal  No congestion  Mouth/Throat:      Mouth: Mucous membranes are moist       Pharynx: Oropharynx is clear  Eyes:      General: No scleral icterus  Extraocular Movements: Extraocular movements intact  Right eye: No nystagmus  Left eye: No nystagmus  Conjunctiva/sclera: Conjunctivae normal       Pupils: Pupils are equal, round, and reactive to light  Cardiovascular:      Rate and Rhythm: Normal rate and regular rhythm  Pulses: Normal pulses  Heart sounds: Normal heart sounds  No murmur heard  No friction rub  No gallop  Pulmonary:      Effort: Pulmonary effort is normal  No respiratory distress  Breath sounds: Normal breath sounds  No wheezing, rhonchi or rales  Abdominal:      General: Abdomen is flat  Bowel sounds are normal  There is no distension  Palpations: Abdomen is soft  Tenderness: There is no abdominal tenderness  There is no guarding  Comments: 1 episode of non-bloody emesis during encounter   Musculoskeletal:         General: No swelling  Normal range of motion  Cervical back: Neck supple  Right lower leg: No edema  Left lower leg: No edema  Right ankle: No swelling  Anterior drawer test negative  Right foot: Normal range of motion and normal capillary refill  Bony tenderness (right 5th metatarsal ) present  No swelling or deformity  Feet:    Skin:     General: Skin is warm  Capillary Refill: Capillary refill takes less than 2 seconds  Findings: Abrasion present  Comments: No piloerection noted    Neurological:      General: No focal deficit present  Mental Status: He is alert and oriented to person, place, and time  Motor: No tremor  Comments: Moves upper and lower extremities symmetrically  Equal b/l  strength and equal b/l dorsiflexion   Psychiatric:         Attention and Perception: Attention normal          Speech: Speech normal          Behavior: Behavior is cooperative  Additional Data:     Labs: keep all most recent labs as listed on admission templates   Results from last 7 days   Lab Units 02/03/22  0454 02/02/22  1530 02/02/22  1530   WBC Thousand/uL 9 40   < > 8 80   HEMOGLOBIN g/dL 13 4*   < > 14 3   HEMATOCRIT % 39 5*   < > 42 8   PLATELETS Thousands/uL 182   < > 231   BANDS PCT %  --   --  1   LYMPHO PCT %  --   --  43   MONO PCT %  --   --  9   EOS PCT %  --   --  3    < > = values in this interval not displayed  Results from last 7 days   Lab Units 02/03/22  0454   SODIUM mmol/L 137   POTASSIUM mmol/L 4 3   CHLORIDE mmol/L 103   CO2 mmol/L 26   BUN mg/dL 8   CREATININE mg/dL 0 75   ANION GAP mmol/L 8   CALCIUM mg/dL 7 4*   ALBUMIN g/dL 3 8   TOTAL BILIRUBIN mg/dL 0 51   ALK PHOS U/L 60   ALT U/L 31   AST U/L 32   GLUCOSE RANDOM mg/dL 113*                Results from last 7 days   Lab Units 02/02/22  1845   HEMOGLOBIN A1C % 5 8*               * I Have Reviewed All Lab Data Listed Above  * Additional Pertinent Lab Tests Reviewed: [place] [facility] Admission Reviewed      Imaging Studies: I have personally reviewed pertinent reports  Recent Cultures (last 7 days):           Today, Patient Was Seen By: Curtis Marquez Terbinafine Counseling: Patient counseling regarding adverse effects of terbinafine including but not limited to headache, diarrhea, rash, upset stomach, liver function test abnormalities, itching, taste/smell disturbance, nausea, abdominal pain, and flatulence.  There is a rare possibility of liver failure that can occur when taking terbinafine.  The patient understands that a baseline LFT and kidney function test may be required. The patient verbalized understanding of the proper use and possible adverse effects of terbinafine.  All of the patient's questions and concerns were addressed. JESSE Tamayo    ** Please Note: Dictation voice to text software may have been used in the creation of this document   ** Rituxan Pregnancy And Lactation Text: This medication is Pregnancy Category C and it isn't know if it is safe during pregnancy. It is unknown if this medication is excreted in breast milk but similar antibodies are known to be excreted. Valtrex Pregnancy And Lactation Text: this medication is Pregnancy Category B and is considered safe during pregnancy. This medication is not directly found in breast milk but it's metabolite acyclovir is present. Otezla Pregnancy And Lactation Text: This medication is Pregnancy Category C and it isn't known if it is safe during pregnancy. It is unknown if it is excreted in breast milk. Xolair Pregnancy And Lactation Text: This medication is Pregnancy Category B and is considered safe during pregnancy. This medication is excreted in breast milk. Elidel Counseling: Patient may experience a mild burning sensation during topical application. Elidel is not approved in children less than 2 years of age. There have been case reports of hematologic and skin malignancies in patients using topical calcineurin inhibitors although causality is questionable. Rhofade Counseling: Rhofade is a topical medication which can decrease superficial blood flow where applied. Side effects are uncommon and include stinging, redness and allergic reactions. Rifampin Counseling: I discussed with the patient the risks of rifampin including but not limited to liver damage, kidney damage, red-orange body fluids, nausea/vomiting and severe allergy. Erivedge Pregnancy And Lactation Text: This medication is Pregnancy Category X and is absolutely contraindicated during pregnancy. It is unknown if it is excreted in breast milk. Bexarotene Pregnancy And Lactation Text: This medication is Pregnancy Category X and should not be given to women who are pregnant or may become pregnant. This medication should not be used if you are breast feeding. Cimzia Counseling:  I discussed with the patient the risks of Cimzia including but not limited to immunosuppression, allergic reactions and infections.  The patient understands that monitoring is required including a PPD at baseline and must alert us or the primary physician if symptoms of infection or other concerning signs are noted. Valtrex Counseling: I discussed with the patient the risks of valacyclovir including but not limited to kidney damage, nausea, vomiting and severe allergy.  The patient understands that if the infection seems to be worsening or is not improving, they are to call. Azithromycin Pregnancy And Lactation Text: This medication is considered safe during pregnancy and is also secreted in breast milk. Siliq Counseling:  I discussed with the patient the risks of Siliq including but not limited to new or worsening depression, suicidal thoughts and behavior, immunosuppression, malignancy, posterior leukoencephalopathy syndrome, and serious infections.  The patient understands that monitoring is required including a PPD at baseline and must alert us or the primary physician if symptoms of infection or other concerning signs are noted. There is also a special program designed to monitor depression which is required with Siliq. Terbinafine Pregnancy And Lactation Text: This medication is Pregnancy Category B and is considered safe during pregnancy. It is also excreted in breast milk and breast feeding isn't recommended. Erivedge Counseling- I discussed with the patient the risks of Erivedge including but not limited to nausea, vomiting, diarrhea, constipation, weight loss, changes in the sense of taste, decreased appetite, muscle spasms, and hair loss.  The patient verbalized understanding of the proper use and possible adverse effects of Erivedge.  All of the patient's questions and concerns were addressed. Protopic Pregnancy And Lactation Text: This medication is Pregnancy Category C. It is unknown if this medication is excreted in breast milk when applied topically. Rifampin Pregnancy And Lactation Text: This medication is Pregnancy Category C and it isn't know if it is safe during pregnancy. It is also excreted in breast milk and should not be used if you are breast feeding. Zyclara Pregnancy And Lactation Text: This medication is Pregnancy Category C. It is unknown if this medication is excreted in breast milk. Otezla Counseling: The side effects of Otezla were discussed with the patient, including but not limited to worsening or new depression, weight loss, diarrhea, nausea, upper respiratory tract infection, and headache. Patient instructed to call the office should any adverse effect occur.  The patient verbalized understanding of the proper use and possible adverse effects of Otezla.  All the patient's questions and concerns were addressed. Drysol Pregnancy And Lactation Text: This medication is considered safe during pregnancy and breast feeding. Cimzia Pregnancy And Lactation Text: This medication crosses the placenta but can be considered safe in certain situations. Cimzia may be excreted in breast milk. Bexarotene Counseling:  I discussed with the patient the risks of bexarotene including but not limited to hair loss, dry lips/skin/eyes, liver abnormalities, hyperlipidemia, pancreatitis, depression/suicidal ideation, photosensitivity, drug rash/allergic reactions, hypothyroidism, anemia, leukopenia, infection, cataracts, and teratogenicity.  Patient understands that they will need regular blood tests to check lipid profile, liver function tests, white blood cell count, thyroid function tests and pregnancy test if applicable. Siliq Pregnancy And Lactation Text: The risk during pregnancy and breastfeeding is uncertain with this medication. Zyclara Counseling:  I discussed with the patient the risks of imiquimod including but not limited to erythema, scaling, itching, weeping, crusting, and pain.  Patient understands that the inflammatory response to imiquimod is variable from person to person and was educated regarded proper titration schedule.  If flu-like symptoms develop, patient knows to discontinue the medication and contact us. Tranexamic Acid Pregnancy And Lactation Text: It is unknown if this medication is safe during pregnancy or breast feeding. Azithromycin Counseling:  I discussed with the patient the risks of azithromycin including but not limited to GI upset, allergic reaction, drug rash, diarrhea, and yeast infections. Drysol Counseling:  I discussed with the patient the risks of drysol/aluminum chloride including but not limited to skin rash, itching, irritation, burning. Azathioprine Counseling:  I discussed with the patient the risks of azathioprine including but not limited to myelosuppression, immunosuppression, hepatotoxicity, lymphoma, and infections.  The patient understands that monitoring is required including baseline LFTs, Creatinine, possible TPMP genotyping and weekly CBCs for the first month and then every 2 weeks thereafter.  The patient verbalized understanding of the proper use and possible adverse effects of azathioprine.  All of the patient's questions and concerns were addressed. Dapsone Pregnancy And Lactation Text: This medication is Pregnancy Category C and is not considered safe during pregnancy or breast feeding. Sarecycline Counseling: Patient advised regarding possible photosensitivity and discoloration of the teeth, skin, lips, tongue and gums.  Patient instructed to avoid sunlight, if possible.  When exposed to sunlight, patients should wear protective clothing, sunglasses, and sunscreen.  The patient was instructed to call the office immediately if the following severe adverse effects occur:  hearing changes, easy bruising/bleeding, severe headache, or vision changes.  The patient verbalized understanding of the proper use and possible adverse effects of sarecycline.  All of the patient's questions and concerns were addressed. Protopic Counseling: Patient may experience a mild burning sensation during topical application. Protopic is not approved in children less than 2 years of age. There have been case reports of hematologic and skin malignancies in patients using topical calcineurin inhibitors although causality is questionable. Acitretin Pregnancy And Lactation Text: This medication is Pregnancy Category X and should not be given to women who are pregnant or may become pregnant in the future. This medication is excreted in breast milk. Cosentyx Counseling:  I discussed with the patient the risks of Cosentyx including but not limited to worsening of Crohn's disease, immunosuppression, allergic reactions and infections.  The patient understands that monitoring is required including a PPD at baseline and must alert us or the primary physician if symptoms of infection or other concerning signs are noted. Cimetidine Counseling:  I discussed with the patient the risks of Cimetidine including but not limited to gynecomastia, headache, diarrhea, nausea, drowsiness, arrhythmias, pancreatitis, skin rashes, psychosis, bone marrow suppression and kidney toxicity. Tranexamic Acid Counseling:  Patient advised of the small risk of bleeding problems with tranexamic acid. They were also instructed to call if they developed any nausea, vomiting or diarrhea. All of the patient's questions and concerns were addressed. Simponi Counseling:  I discussed with the patient the risks of golimumab including but not limited to myelosuppression, immunosuppression, autoimmune hepatitis, demyelinating diseases, lymphoma, and serious infections.  The patient understands that monitoring is required including a PPD at baseline and must alert us or the primary physician if symptoms of infection or other concerning signs are noted. Azathioprine Pregnancy And Lactation Text: This medication is Pregnancy Category D and isn't considered safe during pregnancy. It is unknown if this medication is excreted in breast milk. Wartpeel Pregnancy And Lactation Text: This medication is Pregnancy Category X and contraindicated in pregnancy and in women who may become pregnant. It is unknown if this medication is excreted in breast milk. Dapsone Counseling: I discussed with the patient the risks of dapsone including but not limited to hemolytic anemia, agranulocytosis, rashes, methemoglobinemia, kidney failure, peripheral neuropathy, headaches, GI upset, and liver toxicity.  Patients who start dapsone require monitoring including baseline LFTs and weekly CBCs for the first month, then every month thereafter.  The patient verbalized understanding of the proper use and possible adverse effects of dapsone.  All of the patient's questions and concerns were addressed. Odomzo Counseling- I discussed with the patient the risks of Odomzo including but not limited to nausea, vomiting, diarrhea, constipation, weight loss, changes in the sense of taste, decreased appetite, muscle spasms, and hair loss.  The patient verbalized understanding of the proper use and possible adverse effects of Odomzo.  All of the patient's questions and concerns were addressed. Acitretin Counseling:  I discussed with the patient the risks of acitretin including but not limited to hair loss, dry lips/skin/eyes, liver damage, hyperlipidemia, depression/suicidal ideation, photosensitivity.  Serious rare side effects can include but are not limited to pancreatitis, pseudotumor cerebri, bony changes, clot formation/stroke/heart attack.  Patient understands that alcohol is contraindicated since it can result in liver toxicity and significantly prolong the elimination of the drug by many years. Cosentyx Pregnancy And Lactation Text: This medication is Pregnancy Category B and is considered safe during pregnancy. It is unknown if this medication is excreted in breast milk. Sarecycline Pregnancy And Lactation Text: This medication is Pregnancy Category D and not consider safe during pregnancy. It is also excreted in breast milk. Include Pregnancy/Lactation Warning?: No Wartpeel Counseling:  I discussed with the patient the risks of Wartpeel including but not limited to erythema, scaling, itching, weeping, crusting, and pain. Nsaids Pregnancy And Lactation Text: These medications are considered safe up to 30 weeks gestation. It is excreted in breast milk. Cellcept Counseling:  I discussed with the patient the risks of mycophenolate mofetil including but not limited to infection/immunosuppression, GI upset, hypokalemia, hypercholesterolemia, bone marrow suppression, lymphoproliferative disorders, malignancy, GI ulceration/bleed/perforation, colitis, interstitial lung disease, kidney failure, progressive multifocal leukoencephalopathy, and birth defects.  The patient understands that monitoring is required including a baseline creatinine and regular CBC testing. In addition, patient must alert us immediately if symptoms of infection or other concerning signs are noted. 5-Fu Counseling: 5-Fluorouracil Counseling:  I discussed with the patient the risks of 5-fluorouracil including but not limited to erythema, scaling, itching, weeping, crusting, and pain. Picato Counseling:  I discussed with the patient the risks of Picato including but not limited to erythema, scaling, itching, weeping, crusting, and pain. Tetracycline Counseling: Patient counseled regarding possible photosensitivity and increased risk for sunburn.  Patient instructed to avoid sunlight, if possible.  When exposed to sunlight, patients should wear protective clothing, sunglasses, and sunscreen.  The patient was instructed to call the office immediately if the following severe adverse effects occur:  hearing changes, easy bruising/bleeding, severe headache, or vision changes.  The patient verbalized understanding of the proper use and possible adverse effects of tetracycline.  All of the patient's questions and concerns were addressed. Patient understands to avoid pregnancy while on therapy due to potential birth defects. Clindamycin Pregnancy And Lactation Text: This medication can be used in pregnancy if certain situations. Clindamycin is also present in breast milk. Colchicine Pregnancy And Lactation Text: This medication is Pregnancy Category C and isn't considered safe during pregnancy. It is excreted in breast milk. Doxepin Counseling:  Patient advised that the medication is sedating and not to drive a car after taking this medication. Patient informed of potential adverse effects including but not limited to dry mouth, urinary retention, and blurry vision.  The patient verbalized understanding of the proper use and possible adverse effects of doxepin.  All of the patient's questions and concerns were addressed. Dupixent Counseling: I discussed with the patient the risks of dupilumab including but not limited to eye infection and irritation, cold sores, injection site reactions, worsening of asthma, allergic reactions and increased risk of parasitic infection.  Live vaccines should be avoided while taking dupilumab. Dupilumab will also interact with certain medications such as warfarin and cyclosporine. The patient understands that monitoring is required and they must alert us or the primary physician if symptoms of infection or other concerning signs are noted. Thalidomide Counseling: I discussed with the patient the risks of thalidomide including but not limited to birth defects, anxiety, weakness, chest pain, dizziness, cough and severe allergy. Skyrizi Counseling: I discussed with the patient the risks of risankizumab-rzaa including but not limited to immunosuppression, and serious infections.  The patient understands that monitoring is required including a PPD at baseline and must alert us or the primary physician if symptoms of infection or other concerning signs are noted. Colchicine Counseling:  Patient counseled regarding adverse effects including but not limited to stomach upset (nausea, vomiting, stomach pain, or diarrhea).  Patient instructed to limit alcohol consumption while taking this medication.  Colchicine may reduce blood counts especially with prolonged use.  The patient understands that monitoring of kidney function and blood counts may be required, especially at baseline. The patient verbalized understanding of the proper use and possible adverse effects of colchicine.  All of the patient's questions and concerns were addressed. Nsaids Counseling: NSAID Counseling: I discussed with the patient that NSAIDs should be taken with food. Prolonged use of NSAIDs can result in the development of stomach ulcers.  Patient advised to stop taking NSAIDs if abdominal pain occurs.  The patient verbalized understanding of the proper use and possible adverse effects of NSAIDs.  All of the patient's questions and concerns were addressed. Topical Sulfur Applications Pregnancy And Lactation Text: This medication is Pregnancy Category C and has an unknown safety profile during pregnancy. It is unknown if this topical medication is excreted in breast milk. Mirvaso Pregnancy And Lactation Text: This medication has not been assigned a Pregnancy Risk Category. It is unknown if the medication is excreted in breast milk. Dupixent Pregnancy And Lactation Text: This medication likely crosses the placenta but the risk for the fetus is uncertain. This medication is excreted in breast milk. Prednisone Pregnancy And Lactation Text: This medication is Pregnancy Category C and it isn't know if it is safe during pregnancy. This medication is excreted in breast milk. Calcipotriene Pregnancy And Lactation Text: This medication has not been proven safe during pregnancy. It is unknown if this medication is excreted in breast milk. Doxycycline Counseling:  Patient counseled regarding possible photosensitivity and increased risk for sunburn.  Patient instructed to avoid sunlight, if possible.  When exposed to sunlight, patients should wear protective clothing, sunglasses, and sunscreen.  The patient was instructed to call the office immediately if the following severe adverse effects occur:  hearing changes, easy bruising/bleeding, severe headache, or vision changes.  The patient verbalized understanding of the proper use and possible adverse effects of doxycycline.  All of the patient's questions and concerns were addressed. Topical Sulfur Applications Counseling: Topical Sulfur Counseling: Patient counseled that this medication may cause skin irritation or allergic reactions.  In the event of skin irritation, the patient was advised to reduce the amount of the drug applied or use it less frequently.   The patient verbalized understanding of the proper use and possible adverse effects of topical sulfur application.  All of the patient's questions and concerns were addressed. Doxepin Pregnancy And Lactation Text: This medication is Pregnancy Category C and it isn't known if it is safe during pregnancy. It is also excreted in breast milk and breast feeding isn't recommended. Niacinamide Pregnancy And Lactation Text: These medications are considered safe during pregnancy. Sski Pregnancy And Lactation Text: This medication is Pregnancy Category D and isn't considered safe during pregnancy. It is excreted in breast milk. Detail Level: Simple Prednisone Counseling:  I discussed with the patient the risks of prolonged use of prednisone including but not limited to weight gain, insomnia, osteoporosis, mood changes, diabetes, susceptibility to infection, glaucoma and high blood pressure.  In cases where prednisone use is prolonged, patients should be monitored with blood pressure checks, serum glucose levels and an eye exam.  Additionally, the patient may need to be placed on GI prophylaxis, PCP prophylaxis, and calcium and vitamin D supplementation and/or a bisphosphonate.  The patient verbalized understanding of the proper use and the possible adverse effects of prednisone.  All of the patient's questions and concerns were addressed. Mirvaso Counseling: Mirvaso is a topical medication which can decrease superficial blood flow where applied. Side effects are uncommon and include stinging, redness and allergic reactions. Calcipotriene Counseling:  I discussed with the patient the risks of calcipotriene including but not limited to erythema, scaling, itching, and irritation. Doxycycline Pregnancy And Lactation Text: This medication is Pregnancy Category D and not consider safe during pregnancy. It is also excreted in breast milk but is considered safe for shorter treatment courses. Hydroxyzine Counseling: Patient advised that the medication is sedating and not to drive a car after taking this medication.  Patient informed of potential adverse effects including but not limited to dry mouth, urinary retention, and blurry vision.  The patient verbalized understanding of the proper use and possible adverse effects of hydroxyzine.  All of the patient's questions and concerns were addressed. Enbrel Counseling:  I discussed with the patient the risks of etanercept including but not limited to myelosuppression, immunosuppression, autoimmune hepatitis, demyelinating diseases, lymphoma, and infections.  The patient understands that monitoring is required including a PPD at baseline and must alert us or the primary physician if symptoms of infection or other concerning signs are noted. SSKI Counseling:  I discussed with the patient the risks of SSKI including but not limited to thyroid abnormalities, metallic taste, GI upset, fever, headache, acne, arthralgias, paraesthesias, lymphadenopathy, easy bleeding, arrhythmias, and allergic reaction. Topical Clindamycin Pregnancy And Lactation Text: This medication is Pregnancy Category B and is considered safe during pregnancy. It is unknown if it is excreted in breast milk. Stelara Counseling:  I discussed with the patient the risks of ustekinumab including but not limited to immunosuppression, malignancy, posterior leukoencephalopathy syndrome, and serious infections.  The patient understands that monitoring is required including a PPD at baseline and must alert us or the primary physician if symptoms of infection or other concerning signs are noted. Minoxidil Pregnancy And Lactation Text: This medication has not been assigned a Pregnancy Risk Category but animal studies failed to show danger with the topical medication. It is unknown if the medication is excreted in breast milk. Clofazimine Counseling:  I discussed with the patient the risks of clofazimine including but not limited to skin and eye pigmentation, liver damage, nausea/vomiting, gastrointestinal bleeding and allergy. Niacinamide Counseling: I recommended taking niacin or niacinamide, also know as vitamin B3, twice daily. Recent evidence suggests that taking vitamin B3 (500 mg twice daily) can reduce the risk of actinic keratoses and non-melanoma skin cancers. Side effects of vitamin B3 include flushing and headache. Methotrexate Pregnancy And Lactation Text: This medication is Pregnancy Category X and is known to cause fetal harm. This medication is excreted in breast milk. Fluconazole Counseling:  Patient counseled regarding adverse effects of fluconazole including but not limited to headache, diarrhea, nausea, upset stomach, liver function test abnormalities, taste disturbance, and stomach pain.  There is a rare possibility of liver failure that can occur when taking fluconazole.  The patient understands that monitoring of LFTs and kidney function test may be required, especially at baseline. The patient verbalized understanding of the proper use and possible adverse effects of fluconazole.  All of the patient's questions and concerns were addressed. Erythromycin Counseling:  I discussed with the patient the risks of erythromycin including but not limited to GI upset, allergic reaction, drug rash, diarrhea, increase in liver enzymes, and yeast infections. Spironolactone Pregnancy And Lactation Text: This medication can cause feminization of the male fetus and should be avoided during pregnancy. The active metabolite is also found in breast milk. Hydroxyzine Pregnancy And Lactation Text: This medication is not safe during pregnancy and should not be taken. It is also excreted in breast milk and breast feeding isn't recommended. Hydroxychloroquine Pregnancy And Lactation Text: This medication has been shown to cause fetal harm but it isn't assigned a Pregnancy Risk Category. There are small amounts excreted in breast milk. Topical Clindamycin Counseling: Patient counseled that this medication may cause skin irritation or allergic reactions.  In the event of skin irritation, the patient was advised to reduce the amount of the drug applied or use it less frequently.   The patient verbalized understanding of the proper use and possible adverse effects of clindamycin.  All of the patient's questions and concerns were addressed. Methotrexate Counseling:  Patient counseled regarding adverse effects of methotrexate including but not limited to nausea, vomiting, abnormalities in liver function tests. Patients may develop mouth sores, rash, diarrhea, and abnormalities in blood counts. The patient understands that monitoring is required including LFT's and blood counts.  There is a rare possibility of scarring of the liver and lung problems that can occur when taking methotrexate. Persistent nausea, loss of appetite, pale stools, dark urine, cough, and shortness of breath should be reported immediately. Patient advised to discontinue methotrexate treatment at least three months before attempting to become pregnant.  I discussed the need for folate supplements while taking methotrexate.  These supplements can decrease side effects during methotrexate treatment. The patient verbalized understanding of the proper use and possible adverse effects of methotrexate.  All of the patient's questions and concerns were addressed. Carac Counseling:  I discussed with the patient the risks of Carac including but not limited to erythema, scaling, itching, weeping, crusting, and pain. Minoxidil Counseling: Minoxidil is a topical medication which can increase blood flow where it is applied. It is uncertain how this medication increases hair growth. Side effects are uncommon and include stinging and allergic reactions. Humira Counseling:  I discussed with the patient the risks of adalimumab including but not limited to myelosuppression, immunosuppression, autoimmune hepatitis, demyelinating diseases, lymphoma, and serious infections.  The patient understands that monitoring is required including a PPD at baseline and must alert us or the primary physician if symptoms of infection or other concerning signs are noted. Fluconazole Pregnancy And Lactation Text: This medication is Pregnancy Category C and it isn't know if it is safe during pregnancy. It is also excreted in breast milk. Erythromycin Pregnancy And Lactation Text: This medication is Pregnancy Category B and is considered safe during pregnancy. It is also excreted in breast milk. Hydroxychloroquine Counseling:  I discussed with the patient that a baseline ophthalmologic exam is needed at the start of therapy and every year thereafter while on therapy. A CBC may also be warranted for monitoring.  The side effects of this medication were discussed with the patient, including but not limited to agranulocytosis, aplastic anemia, seizures, rashes, retinopathy, and liver toxicity. Patient instructed to call the office should any adverse effect occur.  The patient verbalized understanding of the proper use and possible adverse effects of Plaquenil.  All the patient's questions and concerns were addressed. Spironolactone Counseling: Patient advised regarding risks of diarrhea, abdominal pain, hyperkalemia, birth defects (for female patients), liver toxicity and renal toxicity. The patient may need blood work to monitor liver and kidney function and potassium levels while on therapy. The patient verbalized understanding of the proper use and possible adverse effects of spironolactone.  All of the patient's questions and concerns were addressed. Taltz Counseling: I discussed with the patient the risks of ixekizumab including but not limited to immunosuppression, serious infections, worsening of inflammatory bowel disease and drug reactions.  The patient understands that monitoring is required including a PPD at baseline and must alert us or the primary physician if symptoms of infection or other concerning signs are noted. Tazorac Pregnancy And Lactation Text: This medication is not safe during pregnancy. It is unknown if this medication is excreted in breast milk. Arava Counseling:  Patient counseled regarding adverse effects of Arava including but not limited to nausea, vomiting, abnormalities in liver function tests. Patients may develop mouth sores, rash, diarrhea, and abnormalities in blood counts. The patient understands that monitoring is required including LFTs and blood counts.  There is a rare possibility of scarring of the liver and lung problems that can occur when taking methotrexate. Persistent nausea, loss of appetite, pale stools, dark urine, cough, and shortness of breath should be reported immediately. Patient advised to discontinue Arava treatment and consult with a physician prior to attempting conception. The patient will have to undergo a treatment to eliminate Arava from the body prior to conception. Benzoyl Peroxide Pregnancy And Lactation Text: This medication is Pregnancy Category C. It is unknown if benzoyl peroxide is excreted in breast milk. Albendazole Counseling:  I discussed with the patient the risks of albendazole including but not limited to cytopenia, kidney damage, nausea/vomiting and severe allergy.  The patient understands that this medication is being used in an off-label manner. Griseofulvin Counseling:  I discussed with the patient the risks of griseofulvin including but not limited to photosensitivity, cytopenia, liver damage, nausea/vomiting and severe allergy.  The patient understands that this medication is best absorbed when taken with a fatty meal (e.g., ice cream or french fries). Metronidazole Counseling:  I discussed with the patient the risks of metronidazole including but not limited to seizures, nausea/vomiting, a metallic taste in the mouth, nausea/vomiting and severe allergy. Birth Control Pills Pregnancy And Lactation Text: This medication should be avoided if pregnant and for the first 30 days post-partum. Tazorac Counseling:  Patient advised that medication is irritating and drying.  Patient may need to apply sparingly and wash off after an hour before eventually leaving it on overnight.  The patient verbalized understanding of the proper use and possible adverse effects of tazorac.  All of the patient's questions and concerns were addressed. Glycopyrrolate Pregnancy And Lactation Text: This medication is Pregnancy Category B and is considered safe during pregnancy. It is unknown if it is excreted breast milk. Imiquimod Counseling:  I discussed with the patient the risks of imiquimod including but not limited to erythema, scaling, itching, weeping, crusting, and pain.  Patient understands that the inflammatory response to imiquimod is variable from person to person and was educated regarded proper titration schedule.  If flu-like symptoms develop, patient knows to discontinue the medication and contact us. Benzoyl Peroxide Counseling: Patient counseled that medicine may cause skin irritation and bleach clothing.  In the event of skin irritation, the patient was advised to reduce the amount of the drug applied or use it less frequently.   The patient verbalized understanding of the proper use and possible adverse effects of benzoyl peroxide.  All of the patient's questions and concerns were addressed. Cyclosporine Counseling:  I discussed with the patient the risks of cyclosporine including but not limited to hypertension, gingival hyperplasia,myelosuppression, immunosuppression, liver damage, kidney damage, neurotoxicity, lymphoma, and serious infections. The patient understands that monitoring is required including baseline blood pressure, CBC, CMP, lipid panel and uric acid, and then 1-2 times monthly CMP and blood pressure. Ilumya Counseling: I discussed with the patient the risks of tildrakizumab including but not limited to immunosuppression, malignancy, posterior leukoencephalopathy syndrome, and serious infections.  The patient understands that monitoring is required including a PPD at baseline and must alert us or the primary physician if symptoms of infection or other concerning signs are noted. Albendazole Pregnancy And Lactation Text: This medication is Pregnancy Category C and it isn't known if it is safe during pregnancy. It is also excreted in breast milk. Tremfya Counseling: I discussed with the patient the risks of guselkumab including but not limited to immunosuppression, serious infections, worsening of inflammatory bowel disease and drug reactions.  The patient understands that monitoring is required including a PPD at baseline and must alert us or the primary physician if symptoms of infection or other concerning signs are noted. Griseofulvin Pregnancy And Lactation Text: This medication is Pregnancy Category X and is known to cause serious birth defects. It is unknown if this medication is excreted in breast milk but breast feeding should be avoided. Glycopyrrolate Counseling:  I discussed with the patient the risks of glycopyrrolate including but not limited to skin rash, drowsiness, dry mouth, difficulty urinating, and blurred vision. Metronidazole Pregnancy And Lactation Text: This medication is Pregnancy Category B and considered safe during pregnancy.  It is also excreted in breast milk. Birth Control Pills Counseling: Birth Control Pill Counseling: I discussed with the patient the potential side effects of OCPs including but not limited to increased risk of stroke, heart attack, thrombophlebitis, deep venous thrombosis, hepatic adenomas, breast changes, GI upset, headaches, and depression.  The patient verbalized understanding of the proper use and possible adverse effects of OCPs. All of the patient's questions and concerns were addressed. Clindamycin Counseling: I counseled the patient regarding use of clindamycin as an antibiotic for prophylactic and/or therapeutic purposes. Clindamycin is active against numerous classes of bacteria, including skin bacteria. Side effects may include nausea, diarrhea, gastrointestinal upset, rash, hives, yeast infections, and in rare cases, colitis. Itraconazole Counseling:  I discussed with the patient the risks of itraconazole including but not limited to liver damage, nausea/vomiting, neuropathy, and severe allergy.  The patient understands that this medication is best absorbed when taken with acidic beverages such as non-diet cola or ginger ale.  The patient understands that monitoring is required including baseline LFTs and repeat LFTs at intervals.  The patient understands that they are to contact us or the primary physician if concerning signs are noted. Ivermectin Counseling:  Patient instructed to take medication on an empty stomach with a full glass of water.  Patient informed of potential adverse effects including but not limited to nausea, diarrhea, dizziness, itching, and swelling of the extremities or lymph nodes.  The patient verbalized understanding of the proper use and possible adverse effects of ivermectin.  All of the patient's questions and concerns were addressed. Cyclophosphamide Pregnancy And Lactation Text: This medication is Pregnancy Category D and it isn't considered safe during pregnancy. This medication is excreted in breast milk. Minocycline Counseling: Patient advised regarding possible photosensitivity and discoloration of the teeth, skin, lips, tongue and gums.  Patient instructed to avoid sunlight, if possible.  When exposed to sunlight, patients should wear protective clothing, sunglasses, and sunscreen.  The patient was instructed to call the office immediately if the following severe adverse effects occur:  hearing changes, easy bruising/bleeding, severe headache, or vision changes.  The patient verbalized understanding of the proper use and possible adverse effects of minocycline.  All of the patient's questions and concerns were addressed. Propranolol Pregnancy And Lactation Text: This medication is Pregnancy Category C and it isn't known if it is safe during pregnancy. It is excreted in breast milk. Topical Retinoid counseling:  Patient advised to apply a pea-sized amount only at bedtime and wait 30 minutes after washing their face before applying.  If too drying, patient may add a non-comedogenic moisturizer. The patient verbalized understanding of the proper use and possible adverse effects of retinoids.  All of the patient's questions and concerns were addressed. Cephalexin Pregnancy And Lactation Text: This medication is Pregnancy Category B and considered safe during pregnancy.  It is also excreted in breast milk but can be used safely for shorter doses. High Dose Vitamin A Pregnancy And Lactation Text: High dose vitamin A therapy is contraindicated during pregnancy and breast feeding. Hydroquinone Counseling:  Patient advised that medication may result in skin irritation, lightening (hypopigmentation), dryness, and burning.  In the event of skin irritation, the patient was advised to reduce the amount of the drug applied or use it less frequently.  Rarely, spots that are treated with hydroquinone can become darker (pseudoochronosis).  Should this occur, patient instructed to stop medication and call the office. The patient verbalized understanding of the proper use and possible adverse effects of hydroquinone.  All of the patient's questions and concerns were addressed. Cyclophosphamide Counseling:  I discussed with the patient the risks of cyclophosphamide including but not limited to hair loss, hormonal abnormalities, decreased fertility, abdominal pain, diarrhea, nausea and vomiting, bone marrow suppression and infection. The patient understands that monitoring is required while taking this medication. Solaraze Pregnancy And Lactation Text: This medication is Pregnancy Category B and is considered safe. There is some data to suggest avoiding during the third trimester. It is unknown if this medication is excreted in breast milk. Infliximab Counseling:  I discussed with the patient the risks of infliximab including but not limited to myelosuppression, immunosuppression, autoimmune hepatitis, demyelinating diseases, lymphoma, and serious infections.  The patient understands that monitoring is required including a PPD at baseline and must alert us or the primary physician if symptoms of infection or other concerning signs are noted. Xeljanz Counseling: I discussed with the patient the risks of Xeljanz therapy including increased risk of infection, liver issues, headache, diarrhea, or cold symptoms. Live vaccines should be avoided. They were instructed to call if they have any problems. Propranolol Counseling:  I discussed with the patient the risks of propranolol including but not limited to low heart rate, low blood pressure, low blood sugar, restlessness and increased cold sensitivity. They should call the office if they experience any of these side effects. Gabapentin Counseling: I discussed with the patient the risks of gabapentin including but not limited to dizziness, somnolence, fatigue and ataxia. High Dose Vitamin A Counseling: Side effects reviewed, pt to contact office should one occur. Cephalexin Counseling: I counseled the patient regarding use of cephalexin as an antibiotic for prophylactic and/or therapeutic purposes. Cephalexin (commonly prescribed under brand name Keflex) is a cephalosporin antibiotic which is active against numerous classes of bacteria, including most skin bacteria. Side effects may include nausea, diarrhea, gastrointestinal upset, rash, hives, yeast infections, and in rare cases, hepatitis, kidney disease, seizures, fever, confusion, neurologic symptoms, and others. Patients with severe allergies to penicillin medications are cautioned that there is about a 10% incidence of cross-reactivity with cephalosporins. When possible, patients with penicillin allergies should use alternatives to cephalosporins for antibiotic therapy. Ketoconazole Counseling:   Patient counseled regarding improving absorption with orange juice.  Adverse effects include but are not limited to breast enlargement, headache, diarrhea, nausea, upset stomach, liver function test abnormalities, taste disturbance, and stomach pain.  There is a rare possibility of liver failure that can occur when taking ketoconazole. The patient understands that monitoring of LFTs may be required, especially at baseline. The patient verbalized understanding of the proper use and possible adverse effects of ketoconazole.  All of the patient's questions and concerns were addressed. Opioid Pregnancy And Lactation Text: These medications can lead to premature delivery and should be avoided during pregnancy. These medications are also present in breast milk in small amounts. Xelrosamariaz Pregnancy And Lactation Text: This medication is Pregnancy Category D and is not considered safe during pregnancy.  The risk during breast feeding is also uncertain. Finasteride Pregnancy And Lactation Text: This medication is absolutely contraindicated during pregnancy. It is unknown if it is excreted in breast milk. Solaraze Counseling:  I discussed with the patient the risks of Solaraze including but not limited to erythema, scaling, itching, weeping, crusting, and pain. Quinolones Counseling:  I discussed with the patient the risks of fluoroquinolones including but not limited to GI upset, allergic reaction, drug rash, diarrhea, dizziness, photosensitivity, yeast infections, liver function test abnormalities, tendonitis/tendon rupture. Bactrim Pregnancy And Lactation Text: This medication is Pregnancy Category D and is known to cause fetal risk.  It is also excreted in breast milk. Eucrisa Counseling: Patient may experience a mild burning sensation during topical application. Eucrisa is not approved in children less than 2 years of age. Isotretinoin Pregnancy And Lactation Text: This medication is Pregnancy Category X and is considered extremely dangerous during pregnancy. It is unknown if it is excreted in breast milk. Xolair Counseling:  Patient informed of potential adverse effects including but not limited to fever, muscle aches, rash and allergic reactions.  The patient verbalized understanding of the proper use and possible adverse effects of Xolair.  All of the patient's questions and concerns were addressed. Rituxan Counseling:  I discussed with the patient the risks of Rituxan infusions. Side effects can include infusion reactions, severe drug rashes including mucocutaneous reactions, reactivation of latent hepatitis and other infections and rarely progressive multifocal leukoencephalopathy.  All of the patient's questions and concerns were addressed. Ketoconazole Pregnancy And Lactation Text: This medication is Pregnancy Category C and it isn't know if it is safe during pregnancy. It is also excreted in breast milk and breast feeding isn't recommended. Finasteride Counseling:  I discussed with the patient the risks of use of finasteride including but not limited to decreased libido, decreased ejaculate volume, gynecomastia, and depression. Women should not handle medication.  All of the patient's questions and concerns were addressed. Opioid Counseling: I discussed with the patient the potential side effects of opioids including but not limited to addiction, altered mental status, and depression. I stressed avoiding alcohol, benzodiazepines, muscle relaxants and sleep aids unless specifically okayed by a physician. The patient verbalized understanding of the proper use and possible adverse effects of opioids. All of the patient's questions and concerns were addressed. They were instructed to flush the remaining pills down the toilet if they did not need them for pain. Oxybutynin Counseling:  I discussed with the patient the risks of oxybutynin including but not limited to skin rash, drowsiness, dry mouth, difficulty urinating, and blurred vision. Isotretinoin Counseling: Patient should get monthly blood tests, not donate blood, not drive at night if vision affected, not share medication, and not undergo elective surgery for 6 months after tx completed. Side effects reviewed, pt to contact office should one occur.

## 2022-02-03 NOTE — PROGRESS NOTES
02/03/22 0802   Referral Data   Referral Name EMS   Referral Reason Drug/Alcohol Mike 54   Readmission Root Cause   30 Day Readmission No   Patient Information   Mental Status Sedated   Primary Caregiver Self   Support System Immediate family   Legal Information   Legal Issues unknown   Health Care Proxy Appointed No   Activities of Daily Living Prior to Admission   Functional Status Independent   Assistive Device No device needed   Living Arrangement House   Ambulation Independent   Access to Firearms   Access to Firearms No  (pt denies)   Income Information   Income Source Employed   [employer]   Means of Transport to App: Drives Self     Pt is a 42year old  male who was admitted to the detox unit after an accidental overdose of suspected opioid  Pt had presented at the [facility] ED via EMS after being found in his garage unresponsive after inhalation of a white powder  Pt's name, date of birth, home address, and telephone number were verified  Pt was informed of case management role and the purpose of the completion of intake with case management  Pt is [place] speaking only and intake was completed in [address]  Pt presented as uncooperative and did not complete intake with cm, stating he was not feeling well  Pt reports he had taken a white powder, which pt had thought was cocaine, intranasally  Pt denied any opiate use and states he only will use cocaine one to two times yearly  Pt also reports he uses beer, 1x weekly, of 6-8 cans  Pt did not provide any information regarding past substance abuse treatment nor indicated any withdrawal symptoms  AUDIT: No score  PAWSS: 1  UDS: negative for all  Ethanol lvl: not completed    Pt did not provide any information regarding his mental health history      Pt did not provide any information regarding his medical history but did state he has medical insurance but could not identify this insurance  Pt did not provide any legal history  Pt reports he is employed at [employer] and he has a license  Pt provided no history regarding  service  Pt reports he resides at home with his wife and two children, ages 25 and 16  Pt did not sign any OSMANY for wife and when asked, ended interview, stating he was not feeling well  No treatment plan was completed due to pt ending interview  Pt indicates a minimal use of substance and did not indicate any desire for treatment  Clinical presentation of pt is pt presents as unaware of how even his minimal use and approach to substance use is effecting his life  Pt could benefit from some therapy to improve his insight into harmful uses of substances  Pt's goals for detox will be to successfully complete his overdose protocols and to develop an aftercare plan that includes education regarding the dangers of substance use  Pt presents in the pre-contemplation stage of change

## 2022-02-03 NOTE — ASSESSMENT & PLAN NOTE
· Secondary to accidental opioid overdose on 2/2/2022    · Patient maintains that he is opioid naive and that he thought he was snorting cocaine    · CXR in ED 2/2/2022- no acute cardiopulmonary disease  · CT head 2/2/2022 negative   · Initial HS trop 4, 4 hr trop 22 (delta 18)  · Patient was started on narcan drip in ED- required titration up to 4 mg/hr  · Narcan drip stopped 0455 this morning  · Respiratory acidosis improving   · Continue to monitor vitals/symptoms- continuous pulse ox revealed occasionally desaturation down to mid-to-low 80s on room air   · Place on 2L O2 via NC to maintain SpO2 >90%  · Will repeat CXR this AM as patient has had multiple episodes of vomiting during admission and occasionally desaturates

## 2022-02-03 NOTE — ASSESSMENT & PLAN NOTE
· Secondary to opioid-induced respiratory failure  · Initial VBG revealed pH 7 11, pCO2 76  · VBG this AM revealed improving respiratory acidosis: pH up to 7 320, pCO2 51  · Continue resp failure management as above  · Continue to monitor with supportive care

## 2022-02-03 NOTE — PLAN OF CARE
Problem: Potential for Falls  Goal: Patient will remain free of falls  Description: INTERVENTIONS:  - Educate patient/family on patient safety including physical limitations  - Instruct patient to call for assistance with activity   - Consult OT/PT to assist with strengthening/mobility   - Keep Call bell within reach  - Keep bed low and locked with side rails adjusted as appropriate  - Keep care items and personal belongings within reach  - Initiate and maintain comfort rounds  - Make Fall Risk Sign visible to staff  - Offer Toileting every 2 Hours, in advance of need  - Apply yellow socks and bracelet for high fall risk patients  - Consider moving patient to room near nurses station  Outcome: Progressing     Problem: MOBILITY - ADULT  Goal: Maintain or return to baseline ADL function  Description: INTERVENTIONS:  -  Assess patient's ability to carry out ADLs; assess patient's baseline for ADL function and identify physical deficits which impact ability to perform ADLs (bathing, care of mouth/teeth, toileting, grooming, dressing, etc )  - Assess/evaluate cause of self-care deficits   - Assess range of motion  - Assess patient's mobility; develop plan if impaired  - Assess patient's need for assistive devices and provide as appropriate  - Encourage maximum independence but intervene and supervise when necessary  - Involve family in performance of ADLs  - Assess for home care needs following discharge   - Consider OT consult to assist with ADL evaluation and planning for discharge  - Provide patient education as appropriate  Outcome: Progressing  Goal: Maintains/Returns to pre admission functional level  Description: INTERVENTIONS:  - Perform BMAT or MOVE assessment daily    - Set and communicate daily mobility goal to care team and patient/family/caregiver  - Collaborate with rehabilitation services on mobility goals if consulted  - Perform Range of Motion 10 times a day  - Reposition patient every 2 hours    - Dangle patient 5 times a day  - Stand patient 5 times a day  - Ambulate patient 5 times a day  - Out of bed to chair 3 times a day   - Out of bed for meals 3 times a day  - Out of bed for toileting  - Record patient progress and toleration of activity level   Outcome: Progressing     Problem: COPING  Goal: Pt/Family able to verbalize concerns and demonstrate effective coping strategies  Description: INTERVENTIONS:  - Assist patient/family to identify coping skills, available support systems and cultural and spiritual values  - Provide emotional support, including active listening and acknowledgement of concerns of patient and caregivers  - Reduce environmental stimuli, as able  - Provide patient education  - Assess for spiritual pain/suffering and initiate spiritual care, including notification of [facility] or [facility] based community as needed  - Assess effectiveness of coping strategies  Outcome: Progressing  Goal: Will report anxiety at manageable levels  Description: INTERVENTIONS:  - Administer medication as ordered  - Teach and encourage coping skills  - Provide emotional support  - Assess patient/family for anxiety and ability to cope  Outcome: Progressing     Problem: SUBSTANCE USE/ABUSE  Goal: Will have no detox symptoms and will verbalize plan for changing substance-related behavior  Description: INTERVENTIONS:  - Monitor physical status and assess for symptoms of withdrawal  - Administer medication as ordered  - Provide emotional support with 1 on 1 interaction with staff  - Encourage recovery focused program/ addiction education  - Assess for verbalization of changing behaviors related to substance abuse  - Initiate consults and referrals as appropriate (Case Management, Spiritual Care, etc )  Outcome: Progressing  Goal: By discharge, will develop insight into their chemical dependency and sustain motivation to continue in recovery  Description: INTERVENTIONS:  - Attends all daily group sessions and scheduled AA groups  - Actively practices coping skills through participation in the therapeutic community and adherence to program rules  - Reviews and completes assignments from individual treatment plan  - Assist patient development of understanding of their personal cycle of addiction and relapse triggers  Outcome: Progressing  Goal: By discharge, patient will have ongoing treatment plan addressing chemical dependency  Description: INTERVENTIONS:  - Assist patient with resources and/or appointments for ongoing recovery based living  Outcome: Progressing     Problem: NEUROSENSORY - ADULT  Goal: Achieves stable or improved neurological status  Description: INTERVENTIONS  - Monitor and report changes in neurological status  - Monitor vital signs such as temperature, blood pressure, glucose, and any other labs ordered   - Initiate measures to prevent increased intracranial pressure  - Monitor for seizure activity and implement precautions if appropriate      Outcome: Progressing  Goal: Remains free of injury related to seizures activity  Description: INTERVENTIONS  - Maintain airway, patient safety  and administer oxygen as ordered  - Monitor patient for seizure activity, document and report duration and description of seizure to physician/advanced practitioner  - If seizure occurs,  ensure patient safety during seizure  - Reorient patient post seizure  - Seizure pads on all 4 side rails  - Instruct patient/family to notify RN of any seizure activity including if an aura is experienced  - Instruct patient/family to call for assistance with activity based on nursing assessment  - Administer anti-seizure medications if ordered    Outcome: Progressing  Goal: Achieves maximal functionality and self care  Description: INTERVENTIONS  - Monitor swallowing and airway patency with patient fatigue and changes in neurological status  - Encourage and assist patient to increase activity and self care     - Encourage visually impaired, hearing impaired and aphasic patients to use assistive/communication devices  Outcome: Progressing     Problem: RESPIRATORY - ADULT  Goal: Achieves optimal ventilation and oxygenation  Description: INTERVENTIONS:  - Assess for changes in respiratory status  - Assess for changes in mentation and behavior  - Position to facilitate oxygenation and minimize respiratory effort  - Oxygen administered by appropriate delivery if ordered  - Initiate smoking cessation education as indicated  - Encourage broncho-pulmonary hygiene including cough, deep breathe, Incentive Spirometry  - Assess the need for suctioning and aspirate as needed  - Assess and instruct to report SOB or any respiratory difficulty  - Respiratory Therapy support as indicated  Outcome: Progressing     Problem: GASTROINTESTINAL - ADULT  Goal: Minimal or absence of nausea and/or vomiting  Description: INTERVENTIONS:  - Administer IV fluids if ordered to ensure adequate hydration  - Maintain NPO status until nausea and vomiting are resolved  - Nasogastric tube if ordered  - Administer ordered antiemetic medications as needed  - Provide nonpharmacologic comfort measures as appropriate  - Advance diet as tolerated, if ordered  - Consider nutrition services referral to assist patient with adequate nutrition and appropriate food choices  Outcome: Progressing  Goal: Maintains adequate nutritional intake  Description: INTERVENTIONS:  - Monitor percentage of each meal consumed  - Identify factors contributing to decreased intake, treat as appropriate  - Assist with meals as needed  - Monitor I&O, weight, and lab values if indicated  - Obtain nutrition services referral as needed  Outcome: Progressing     Problem: METABOLIC, FLUID AND ELECTROLYTES - ADULT  Goal: Electrolytes maintained within normal limits  Description: INTERVENTIONS:  - Monitor labs and assess patient for signs and symptoms of electrolyte imbalances  - Administer electrolyte replacement as ordered  - Monitor response to electrolyte replacements, including repeat lab results as appropriate  - Instruct patient on fluid and nutrition as appropriate  Outcome: Progressing     Problem: SKIN/TISSUE INTEGRITY - ADULT  Goal: Skin Integrity remains intact(Skin Breakdown Prevention)  Description: Assess:  -Perform Donal assessment every shift  -Clean and moisturize skin every 2 hours  -Inspect skin when repositioning, toileting, and assisting with ADLS  -Assess extremities for adequate circulation and sensation     Bed Management:  -Have minimal linens on bed & keep smooth, unwrinkled  -Change linens as needed when moist or perspiring  -Avoid sitting or lying in one position for more than 2 hours while in bed  -Keep HOB at 30 degrees     Toileting:  -Offer bedside commode  -Assess for incontinence every 2 hours    Activity:  -Mobilize patient 10 times a day  -Encourage activity and walks on unit  -Encourage or provide ROM exercises   -Turn and reposition patient every 2 Hours  -Use appropriate equipment to lift or move patient in bed  -Instruct/ Assist with weight shifting every 2 hours when out of bed in chair  -Consider limitation of chair time 2 hour intervals    Skin Care:  -Avoid use of baby powder, tape, friction and shearing, hot water or constrictive clothing  -Relieve pressure over bony prominences using pillows  -Do not massage red bony areas    Outcome: Progressing

## 2022-02-03 NOTE — PLAN OF CARE
Problem: SUBSTANCE USE/ABUSE  Goal: Will have no detox symptoms and will verbalize plan for changing substance-related behavior  Description: INTERVENTIONS:  - Monitor physical status and assess for symptoms of withdrawal  - Administer medication as ordered  - Provide emotional support with 1 on 1 interaction with staff  - Encourage recovery focused program/ addiction education  - Assess for verbalization of changing behaviors related to substance abuse  - Initiate consults and referrals as appropriate (Case Management, Spiritual Care, etc )  Outcome: Progressing  Goal: By discharge, will develop insight into their chemical dependency and sustain motivation to continue in recovery  Description: INTERVENTIONS:  - Attends all daily group sessions and scheduled AA groups  - Actively practices coping skills through participation in the therapeutic community and adherence to program rules  - Reviews and completes assignments from individual treatment plan  - Assist patient development of understanding of their personal cycle of addiction and relapse triggers  Outcome: Progressing  Goal: By discharge, patient will have ongoing treatment plan addressing chemical dependency  Description: INTERVENTIONS:  - Assist patient with resources and/or appointments for ongoing recovery based living  Outcome: Progressing     Problem: METABOLIC, FLUID AND ELECTROLYTES - ADULT  Goal: Electrolytes maintained within normal limits  Description: INTERVENTIONS:  - Monitor labs and assess patient for signs and symptoms of electrolyte imbalances  - Administer electrolyte replacement as ordered  - Monitor response to electrolyte replacements, including repeat lab results as appropriate  - Instruct patient on fluid and nutrition as appropriate  Outcome: Progressing

## 2022-02-03 NOTE — ASSESSMENT & PLAN NOTE
· CMP 2/2/2022 revealed K+ 2 7  magnesium 2 5  · Supplementation administered  · Potassium improved to 4 3 this morning  Magnesium 1 6  · Continue to monitor and optimize electrolytes

## 2022-02-04 ENCOUNTER — APPOINTMENT (INPATIENT)
Dept: RADIOLOGY | Facility: HOSPITAL | Age: 47
DRG: 812 | End: 2022-02-04
Payer: MEDICAID

## 2022-02-04 ENCOUNTER — APPOINTMENT (INPATIENT)
Dept: NON INVASIVE DIAGNOSTICS | Facility: HOSPITAL | Age: 47
DRG: 812 | End: 2022-02-04
Payer: MEDICAID

## 2022-02-04 PROBLEM — R42 DIZZINESS: Status: ACTIVE | Noted: 2022-02-04

## 2022-02-04 LAB
ANION GAP SERPL CALCULATED.3IONS-SCNC: 4 MMOL/L (ref 5–14)
AORTIC ROOT: 3.2 CM
ARTERIAL PATENCY WRIST A: YES
BASE EX.OXY STD BLDV CALC-SCNC: 94.9 %
BASE EX.OXY STD BLDV CALC-SCNC: 95.9 %
BASE EXCESS BLDV CALC-SCNC: 6.3 MMOL/L (ref -2.1–2.1)
BASE EXCESS BLDV CALC-SCNC: 7.2 MMOL/L (ref -2.1–2.1)
BUN SERPL-MCNC: 16 MG/DL (ref 5–25)
CALCIUM SERPL-MCNC: 8.6 MG/DL (ref 8.4–10.2)
CHLORIDE SERPL-SCNC: 100 MMOL/L (ref 97–108)
CO2 SERPL-SCNC: 35 MMOL/L (ref 22–30)
CREAT SERPL-MCNC: 0.99 MG/DL (ref 0.7–1.5)
E WAVE DECELERATION TIME: 132 MS
ERYTHROCYTE [DISTWIDTH] IN BLOOD BY AUTOMATED COUNT: 14.6 %
FRACTIONAL SHORTENING: 31 % (ref 28–44)
GFR SERPL CREATININE-BSD FRML MDRD: 92 ML/MIN/1.73SQ M
GLUCOSE SERPL-MCNC: 112 MG/DL (ref 65–140)
GLUCOSE SERPL-MCNC: 122 MG/DL (ref 70–99)
HCO3 BLDV-SCNC: 34.2 MMOL/L (ref 23–28)
HCO3 BLDV-SCNC: 34.3 MMOL/L (ref 23–28)
HCT VFR BLD AUTO: 41.1 % (ref 41–53)
HGB BLD-MCNC: 13.8 G/DL (ref 13.5–17.5)
INTERVENTRICULAR SEPTUM IN DIASTOLE (PARASTERNAL SHORT AXIS VIEW): 0.8 CM (ref 0.53–0.99)
LEFT ATRIUM AREA SYSTOLE SINGLE PLANE A4C: 11.8 CM2
LEFT ATRIUM SIZE: 3.3 CM
LEFT INTERNAL DIMENSION IN SYSTOLE: 3.1 CM (ref 2.1–4)
LEFT VENTRICULAR INTERNAL DIMENSION IN DIASTOLE: 4.5 CM (ref 4.69–6.98)
LEFT VENTRICULAR POSTERIOR WALL IN END DIASTOLE: 0.9 CM (ref 0.52–0.98)
LEFT VENTRICULAR STROKE VOLUME: 55 ML
MAGNESIUM SERPL-MCNC: 2.3 MG/DL (ref 1.6–2.3)
MCH RBC QN AUTO: 30.6 PG (ref 26–34)
MCHC RBC AUTO-ENTMCNC: 33.5 G/DL (ref 31–36)
MCV RBC AUTO: 91 FL (ref 80–100)
MV E'TISSUE VEL-SEP: 14 CM/S
MV PEAK A VEL: 0.78 M/S
MV PEAK E VEL: 94 CM/S
MV STENOSIS PRESSURE HALF TIME: 0 MS
O2 CT BLDV-SCNC: 18.5 ML/DL
O2 CT BLDV-SCNC: 20 ML/DL
PCO2 BLDV: 58 MM HG (ref 41–51)
PCO2 BLDV: 62 MM HG (ref 41–51)
PH BLDV: 7.35 [PH] (ref 7.35–7.45)
PH BLDV: 7.38 [PH] (ref 7.35–7.45)
PLATELET # BLD AUTO: 180 THOUSANDS/UL (ref 150–450)
PMV BLD AUTO: 8.6 FL (ref 8.9–12.7)
PO2 BLDV: 100 MM HG
PO2 BLDV: 160 MM HG
POTASSIUM SERPL-SCNC: 4.1 MMOL/L (ref 3.6–5)
RBC # BLD AUTO: 4.5 MILLION/UL (ref 4.5–5.9)
RIGHT ATRIUM AREA SYSTOLE A4C: 14.7 CM2
RIGHT VENTRICLE ID DIMENSION: 3.9 CM
SL CV LV EF: 58
SL CV PED ECHO LEFT VENTRICLE DIASTOLIC VOLUME (MOD BIPLANE) 2D: 92 ML
SL CV PED ECHO LEFT VENTRICLE SYSTOLIC VOLUME (MOD BIPLANE) 2D: 37 ML
SODIUM SERPL-SCNC: 139 MMOL/L (ref 137–147)
TR MAX PG: 19 MMHG
TRICUSPID VALVE PEAK REGURGITATION VELOCITY: 2.16 M/S
WBC # BLD AUTO: 9.3 THOUSAND/UL (ref 4.5–11)
Z-SCORE OF INTERVENTRICULAR SEPTUM IN END DIASTOLE: 0.32
Z-SCORE OF LEFT VENTRICULAR DIMENSION IN END SYSTOLE: -2.39
Z-SCORE OF LEFT VENTRICULAR POSTERIOR WALL IN END DIASTOLE: 1.28

## 2022-02-04 PROCEDURE — 93306 TTE W/DOPPLER COMPLETE: CPT

## 2022-02-04 PROCEDURE — 82805 BLOOD GASES W/O2 SATURATION: CPT | Performed by: PHYSICIAN ASSISTANT

## 2022-02-04 PROCEDURE — 80048 BASIC METABOLIC PNL TOTAL CA: CPT | Performed by: PHYSICIAN ASSISTANT

## 2022-02-04 PROCEDURE — 97163 PT EVAL HIGH COMPLEX 45 MIN: CPT

## 2022-02-04 PROCEDURE — 85027 COMPLETE CBC AUTOMATED: CPT | Performed by: PHYSICIAN ASSISTANT

## 2022-02-04 PROCEDURE — 99232 SBSQ HOSP IP/OBS MODERATE 35: CPT | Performed by: EMERGENCY MEDICINE

## 2022-02-04 PROCEDURE — 82805 BLOOD GASES W/O2 SATURATION: CPT

## 2022-02-04 PROCEDURE — 71045 X-RAY EXAM CHEST 1 VIEW: CPT

## 2022-02-04 PROCEDURE — 93306 TTE W/DOPPLER COMPLETE: CPT | Performed by: INTERNAL MEDICINE

## 2022-02-04 PROCEDURE — 83735 ASSAY OF MAGNESIUM: CPT | Performed by: PHYSICIAN ASSISTANT

## 2022-02-04 RX ORDER — DIPHENHYDRAMINE HCL 25 MG
25 TABLET ORAL EVERY 6 HOURS PRN
Status: DISCONTINUED | OUTPATIENT
Start: 2022-02-04 | End: 2022-02-05 | Stop reason: HOSPADM

## 2022-02-04 RX ORDER — MECLIZINE HCL 12.5 MG/1
25 TABLET ORAL EVERY 12 HOURS PRN
Status: DISCONTINUED | OUTPATIENT
Start: 2022-02-04 | End: 2022-02-05 | Stop reason: HOSPADM

## 2022-02-04 RX ADMIN — ENOXAPARIN SODIUM 40 MG: 40 INJECTION SUBCUTANEOUS at 08:04

## 2022-02-04 RX ADMIN — DIPHENHYDRAMINE HCL 25 MG: 25 TABLET ORAL at 12:52

## 2022-02-04 RX ADMIN — MECLIZINE 25 MG: 12.5 TABLET ORAL at 12:41

## 2022-02-04 RX ADMIN — DIPHENHYDRAMINE HCL 25 MG: 25 TABLET ORAL at 21:22

## 2022-02-04 NOTE — ASSESSMENT & PLAN NOTE
· Secondary to opioid-induced respiratory failure  · Initial VBG revealed pH 7 11, pCO2 76  · VBG this AM revealed increasing pCO2 since yesterday: pH up to 7 350, pCO2 51 --> 62  · Continue resp failure management as above  · Continue to monitor with supportive care    · Sleep studies and home O2 studies ordered for patient, obtain ABG tomorrow AM  · CXR pending

## 2022-02-04 NOTE — ASSESSMENT & PLAN NOTE
· Reports mild dull right foot discomfort with weight-bearing/ambulation x 2 days since jumping off truck at work  · TTP of right 5th metatarsal and overlying ecchymosis noted on exam   · XR right foot- no acute fracture  · Patient has been ambulating to the bathroom without complaints

## 2022-02-04 NOTE — PHYSICAL THERAPY NOTE
Physical Therapy Evaluation    Patient's Name: Lori Scott    Admitting Diagnosis  Acute respiratory failure ([facility] [address] ) [J96 00]  Hypokalemia [E87 6]  Overdose [T50 901A]  Alcohol use [Z72 89]  Opioid overdose ([facility] [address] ) [T40 2X1A]  Cocaine use [F14 90]    Problem List  Patient Active Problem List   Diagnosis    Acute respiratory failure with hypoxia and hypercapnia (HCC)    Respiratory acidosis    Substance use disorder    Hypocalcemia    Prediabetes    Alcohol use    Acute foot pain, right    Dizziness       Past Medical History  History reviewed  No pertinent past medical history  Past Surgical History  History reviewed  No pertinent surgical history  Recent Imaging  XR chest portable   Final Result by Chiquita Summers MD (02/04 1110)      No acute cardiopulmonary disease  Workstation performed: [id number]         XR chest portable   Final Result by Fannie Pandey MD (02/03 1143)      No acute cardiopulmonary disease  Workstation performed: [id number]         XR foot 3+ vw right   Final Result by Chiquita Summers MD (02/03 1105)      No acute osseous abnormality  Workstation performed: [id number]         CT head without contrast   Final Result by Tim Garcia MD (02/02 1707)      No acute intracranial abnormality  Workstation performed: [id number]         XR chest 1 view portable   ED Interpretation by Rony Zavala MD (02/02 1558)   No acute disease      Final Result by Jad Prieto MD (02/02 1603)      1  No acute cardiopulmonary disease  2   Partially imaged air-filled dilated loops of bowel overlying the left upper abdomen, dedicated imaging of the abdomen could further evaluate if clinically indicated                    Workstation performed: [id number]             Recent Vital Signs  Vitals:    02/04/22 0834 02/04/22 0940 02/04/22 1000 02/04/22 1200   BP: 132/85      BP Location:       Pulse: 80  (!) 107 80   Resp:       Temp:       TempSrc:       SpO2: 99% 95% 99% 95%   Weight: 82 1 kg (181 lb)      Height: 5' 6" (1 676 m)           02/04/22 1025   PT Last Visit   PT Visit Date 02/04/22   Note Type   Note type Evaluation   Pain Assessment   Pain Assessment Tool 0-10   Pain Score No Pain   Restrictions/Precautions   Weight Bearing Precautions Per Order No   General   Family/Caregiver Present No   Cognition   Overall Cognitive Status WFL   Arousal/Participation Alert   Orientation Level Oriented X4   Memory Within functional limits   Following Commands Follows all commands and directions without difficulty   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   RLE Assessment   RLE Assessment   (4/5)   LLE Assessment   LLE Assessment   (4/5)   Vestibular   Vestibular Comments pt reporting disequilibrium with head turns in standing and with transfers sit to stand; worse since admitted to hospital however sx chronic and intermittent for last 2 years   Coordination   Movements are Fluid and Coordinated 0   Coordination and Movement Description stiff movments in standing with decreased fluidity due to dizziness with normal movements   Sensation WFL   Light Touch   RLE Light Touch Grossly intact   LLE Light Touch Grossly intact   Bed Mobility   Supine to Sit 6  Modified independent   Sit to Supine 6  Modified independent   Transfers   Sit to Stand 5  Supervision   Stand to Sit 5  Supervision   Additional Comments no AD; unsteady due to disequilibrium with standing   Ambulation/Elevation   Gait pattern Step through pattern;Decreased toe off;Decreased heel strike; Short stride;Decreased foot clearance; Wide FABIANO   Gait Assistance 5  Supervision   Additional items Verbal cues   Assistive Device None   Distance 100ft   Balance   Static Sitting Fair +   Dynamic Sitting Fair +   Static Standing Fair   Dynamic Standing Fair -   Ambulatory Fair -   Endurance Deficit   Endurance Deficit Yes   Endurance Deficit Description limited by dizziness in standing BP stable   Activity Tolerance   Activity Tolerance Treatment limited secondary to medical complications (Comment)   Medical Staff Made Aware spoke to MD KAMLA   Nurse Made Aware spoke to RN   Assessment   Prognosis Good   Problem List Decreased strength;Decreased endurance; Impaired balance;Decreased mobility; Decreased coordination; Impaired vision   Barriers to Discharge Inaccessible home environment;Decreased caregiver support   Goals   Patient Goals to get rid of dizziness   Recommendation   PT Discharge Recommendation Home with outpatient rehabilitation  (vestibular therapy evaluation)   AM-PAC Basic Mobility Inpatient   Turning in Bed Without Bedrails 4   Lying on Back to Sitting on Edge of Flat Bed 4   Moving Bed to Chair 4   Standing Up From Chair 4   Walk in Room 3   Climb 3-5 Stairs 3   Basic Mobility Inpatient Raw Score 22   Basic Mobility Standardized Score 47 4   Highest Level Of Mobility   JH-HLM Goal 7: Walk 25 feet or more   JH-HLM Highest Level of Mobility 7: Walk 25 feet or more   JH-HLM Goal Achieved Yes   End of Consult   Patient Position at End of Consult Seated edge of bed; All needs within reach         Mariluz Guzman is a 42 y o  male admitted to [facility] on 2/2/2022 for Acute respiratory failure with hypoxia and hypercapnia ([facility] [address] )  Pt  has no past medical history on file    PT was consulted and pt was seen on 2/4/2022 for mobility assessment and d/c planning  Pt presents supine in bed alert and agreeable to therapy  He is reporting dizziness with standing and ambulating chronically worse since admission  No pain  Endurance is limited by dizziness  BP stable  Strength with potential mild deconditioning  His balance is also impaired at times depending on level of dizziness   Recommend vestibular evaluation as original sx sound to be related to vestibular impairment  Impairments limiting pt at this time include impaired balance, decreased endurance, decreased coordination, increased fall risk, decreased activity tolerance and decreased strength  Pt is currently functioning at a independent level for bed mobility, supervision assistance x1 level for transfers, supervision assistance x1 level for ambulation with no assistive device  The patient's AM-PAC Basic Mobility Inpatient Short Form Raw Score is 22  A Raw score of greater than 16 suggests the patient may benefit from discharge to home  Please also refer to the recommendation of the Physical Therapist for safe discharge planning      Recommendations                                                                                                                DME: None    Discharge Disposition:  Home with Outpatient Physical Therapy       Tung Ramirez PT, DPT

## 2022-02-04 NOTE — ASSESSMENT & PLAN NOTE
· Patient reports that he has had symptoms of dizziness and weakness ever since he had COVID a while back  · To provider, patient reports dizziness when attempting to get up  Has not tried to get out of bed much  · With PT, patient reports vertiginous symptoms, however patient was not noted to have ambulatory dysfunction and recommended outpatient therapy  · Start meclizine  · Follow up echocardiogram results:  Left Ventricle: Left ventricular cavity size is normal  Wall thickness is normal  The left ventricular ejection fraction is 58%   Systolic function is normal  Wall motion is normal  Diastolic function is normal   · Pursue further diagnostic imaging such as CTA head and neck or MRI if patient has persistent disruptive vertigo/dizziness especially if patient develops neurologic symptoms  · Encourage ambulation around the unit

## 2022-02-04 NOTE — PROGRESS NOTES
PROGRESS NOTE  DEPARTMENT OF MEDICAL TOXICOLOGY  LEVEL 4 MEDICAL DETOX UNIT  Aristides Mahmood Mariano 42 y o  male MRN: [id number]  Unit/Bed#: 5T DETOX 501-01 Encounter: [id number]      Reason for Admission/Principal Problem: substance overdose/abuse  Rounding Provider: Estephania Do MD  Attending Provider: Derrell Ca DO   2/2/2022  3:21 PM           Dizziness  Assessment & Plan  · Patient reports that he has had symptoms of dizziness and weakness ever since he had COVID a while back  · To provider, patient reports dizziness when attempting to get up  Has not tried to get out of bed much  · With PT, patient reports vertiginous symptoms, however patient was not noted to have ambulatory dysfunction and recommended outpatient therapy    · Start meclizine  · Follow up echocardiogram results  · Pursue further diagnostic imaging such as CTA head and neck or MRI if patient has persistent disruptive vertigo/dizziness especially if patient develops neurologic symptoms  · Encourage ambulation around the unit    Acute foot pain, right  Assessment & Plan  · Reports mild dull right foot discomfort with weight-bearing/ambulation x 2 days since jumping off truck at work  · TTP of right 5th metatarsal and overlying ecchymosis noted on exam   · XR right foot- no acute fracture  · Patient has been ambulating to the bathroom without complaints    Alcohol use  Assessment & Plan  Patient with h/o occasional alcohol use  · Reports he currently consumes 5-6 beers weekly/bi-weekly  · Reports he consumed 7 beers on day prior to arrival  Pertinent labs:  Ethanol 95 (2/22/2022 1611 pm)  Given reported infrequent use of alcohol, do not suspect severe alcohol withdrawal to occur, however will monitor during admission -- patient has so far not exhibited signs of withdrawal  Encourage cessation  Consult case management for assistance with rehab resource    Prediabetes  Assessment & Plan  · hgb A1C 2/2/2022 5 8  · Educated patient regarding prediabetes  · Encourage healthy diet, exercise  · Recommend outpatient follow-up with PCP     Hypocalcemia  Assessment & Plan  · Continue to optimize electrolytes    Substance use disorder  Assessment & Plan  · Patient reports intermittent cocaine abuse but denies opioid abuse  · Reports he occasionally snorts cocaine every 3-4 months   · States he took 2 snorts of what he thought was cocaine prior to admission  · Pending results of Expanded Toxicology Screen  · Consult case management for further coordination of care- patient not interested in substance use treatment at this time    Respiratory acidosis  Assessment & Plan  · Secondary to opioid-induced respiratory failure  · Initial VBG revealed pH 7 11, pCO2 76  · VBG this AM revealed increasing pCO2 since yesterday: pH up to 7 350, pCO2 51 --> 62  · Continue resp failure management as above  · Continue to monitor with supportive care  · CPAP ordered for night, obtain ABG tomorrow AM  · CXR pending    * Acute respiratory failure with hypoxia and hypercapnia (HCC)  Assessment & Plan  · Secondary to accidental opioid overdose on 2/2/2022    · Patient maintains that he is opioid naive and that he thought he was snorting cocaine    · CXR in ED 2/2/2022- no acute cardiopulmonary disease  · CT head 2/2/2022 negative   · Initial HS trop 4, 4 hr trop 22 (delta 18)  · Patient was started on narcan drip in ED- required titration up to 4 mg/hr  · Narcan drip stopped 0455 yesterday  · Respiratory acidosis improving   · Continue to monitor vitals/symptoms- continuous pulse ox revealed occasionally desaturation down to mid-to-low 80s on room air   · Place on 2L O2 via NC to maintain SpO2 >90%  · Will repeat CXR this AM as patient has had multiple episodes of vomiting during admission and occasionally desaturates especially at night  · Evening CPAP ordered as trial for patient  · Expanded Toxicology Screening Blood Test has been ordered on patient -- can be followed up after discharge if it has not returned    Hypokalemia-resolved as of 2/3/2022  Assessment & Plan  · CMP 2022 revealed K+ 2 7  magnesium 2 5  · Supplementation administered  · Potassium improved to 4 3 this morning  Magnesium 1 6  · Continue to monitor and optimize electrolytes  VTE Pharmacologic Prophylaxis:   Pharmacologic: Enoxaparin (Lovenox)  Mechanical VTE Prophylaxis in Place: yes    Code Status: Level 1 - Full Code    Patient Centered Rounds: I have performed bedside rounds with nursing staff today  Discussions with Specialists or Other Care Team Provider: none     Education and Discussions with Family / Patient: No    Time Spent for Care: 45 minutes  More than 50% of total time spent on counseling and coordination of care as described above      Current Length of Stay: 2 day(s)    Current Patient Status: Inpatient     Certification Statement: The patient will continue to require additional inpatient hospital stay due to hypoxia, dizziness Discharge Plan: within 24 hours        Subjective:   Patient complains of deconditioning, generalized weakness/dizziness, no other complaints    Objective:     Clinical Opiate Withdrawal Scale  Pulse: (!) 107    No data recorded      Last 24 Hours Medication List:   Current Facility-Administered Medications   Medication Dose Route Frequency Provider Last Rate    albuterol  2 puff Inhalation Q4H PRN Cathie Loredo DO      enoxaparin  40 mg Subcutaneous Daily Joanne Tamayo PA-C      meclizine  25 mg Oral Q12H PRN Boston Kennedy MD      naloxone [facility]) 500 mL infusion  4 mg/hr Intravenous Continuous Clezarina Martinez, DO Stopped (22 0455)    ondansetron  4 mg Intravenous Q6H PRN Clezarina Martinezpe, DO           Vitals:   Temp (24hrs), Av °F (36 7 °C), Min:97 6 °F (36 4 °C), Max:98 5 °F (36 9 °C)    Temp:  [97 6 °F (36 4 °C)-98 5 °F (36 9 °C)] 97 9 °F (36 6 °C)  HR:  [] 107  Resp:  [16-18] 18  BP: (122-149)/(62-97) 132/85  SpO2:  [86 %-100 %] 99 %  Body mass index is 29 21 kg/m²  Input and Output Summary (last 24 hours):No intake or output data in the 24 hours ending 02/04/22 1141    Physical Exam:   Physical Exam  Vitals and nursing note reviewed  Constitutional:       General: He is not in acute distress  Appearance: He is well-developed  He is obese  He is not ill-appearing, toxic-appearing or diaphoretic  HENT:      Head: Normocephalic and atraumatic  Right Ear: External ear normal       Left Ear: External ear normal       Nose: Nose normal       Mouth/Throat:      Mouth: Mucous membranes are moist    Eyes:      General:         Right eye: No discharge  Left eye: No discharge  Conjunctiva/sclera: Conjunctivae normal       Pupils: Pupils are equal, round, and reactive to light  Cardiovascular:      Rate and Rhythm: Tachycardia present  Heart sounds: No friction rub  Pulmonary:      Effort: Pulmonary effort is normal  No respiratory distress  Breath sounds: No stridor  Musculoskeletal:         General: No tenderness or deformity  Normal range of motion  Cervical back: Normal range of motion and neck supple  Skin:     General: Skin is warm and dry  Capillary Refill: Capillary refill takes less than 2 seconds  Neurological:      Mental Status: He is alert and oriented to person, place, and time  Psychiatric:         Mood and Affect: Mood normal          Additional Data:     Labs:   Results from last 7 days   Lab Units 02/04/22  0559 02/03/22  0454 02/02/22  1530   WBC Thousand/uL 9 30   < > 8 80   HEMOGLOBIN g/dL 13 8   < > 14 3   HEMATOCRIT % 41 1   < > 42 8   PLATELETS Thousands/uL 180   < > 231   BANDS PCT %  --   --  1   LYMPHO PCT %  --   --  43   MONO PCT %  --   --  9   EOS PCT %  --   --  3    < > = values in this interval not displayed        Results from last 7 days   Lab Units 02/04/22  0559 02/03/22  0454 02/03/22  0454   SODIUM mmol/L 139   < > 137 POTASSIUM mmol/L 4 1   < > 4 3   CHLORIDE mmol/L 100   < > 103   CO2 mmol/L 35*   < > 26   BUN mg/dL 16   < > 8   CREATININE mg/dL 0 99   < > 0 75   ANION GAP mmol/L 4*   < > 8   CALCIUM mg/dL 8 6   < > 7 4*   ALBUMIN g/dL  --   --  3 8   TOTAL BILIRUBIN mg/dL  --   --  0 51   ALK PHOS U/L  --   --  60   ALT U/L  --   --  31   AST U/L  --   --  32   GLUCOSE RANDOM mg/dL 122*   < > 113*    < > = values in this interval not displayed  Results from last 7 days   Lab Units 02/03/22  0522   POC GLUCOSE mg/dl 112      Results from last 7 days   Lab Units 02/02/22  1845   HEMOGLOBIN A1C % 5 8*               * I Have Reviewed All Lab Data Listed Above  * Additional Pertinent Lab Tests Reviewed: [place] [facility] Admission Reviewed      Imaging Studies: I have personally reviewed pertinent reports  Recent Cultures (last 7 days): Today, Patient Was Seen By: Gamaliel Floyd MD    ** Please Note: Dictation voice to text software may have been used in the creation of this document   **

## 2022-02-04 NOTE — ASSESSMENT & PLAN NOTE
Patient with h/o occasional alcohol use  · Reports he currently consumes 5-6 beers weekly/bi-weekly  · Reports he consumed 7 beers on day prior to arrival  Pertinent labs:  Ethanol 95 (2/22/2022 1611 pm)  Given reported infrequent use of alcohol, do not suspect severe alcohol withdrawal to occur, however will monitor during admission -- patient has so far not exhibited signs of withdrawal  Encourage cessation  Consult case management for assistance with rehab resource

## 2022-02-04 NOTE — ASSESSMENT & PLAN NOTE
· Secondary to accidental opioid overdose on 2/2/2022    · Patient maintains that he is opioid naive and that he thought he was snorting cocaine    · CXR in ED 2/2/2022- no acute cardiopulmonary disease  · CT head 2/2/2022 negative   · Initial HS trop 4, 4 hr trop 22 (delta 18)  · Patient was started on narcan drip in ED- required titration up to 4 mg/hr  · Narcan drip stopped 0455 yesterday  · Respiratory acidosis improving   · Continue to monitor vitals/symptoms- continuous pulse ox revealed occasionally desaturation down to mid-to-low 80s on room air   · Place on 2L O2 via NC to maintain SpO2 >90%  · Will repeat CXR this AM as patient has had multiple episodes of vomiting during admission and occasionally desaturates especially at night  · Sleep studies and home O2 studies ordered as trial for patient  · Expanded Toxicology Screening Blood Test has been ordered on patient -- can be followed up after discharge if it has not returned

## 2022-02-04 NOTE — PLAN OF CARE
Problem: SUBSTANCE USE/ABUSE  Goal: Will have no detox symptoms and will verbalize plan for changing substance-related behavior  Description: INTERVENTIONS:  - Monitor physical status and assess for symptoms of withdrawal  - Administer medication as ordered  - Provide emotional support with 1 on 1 interaction with staff  - Encourage recovery focused program/ addiction education  - Assess for verbalization of changing behaviors related to substance abuse  - Initiate consults and referrals as appropriate (Case Management, Spiritual Care, etc )  Outcome: Progressing  Goal: By discharge, will develop insight into their chemical dependency and sustain motivation to continue in recovery  Description: INTERVENTIONS:  - Attends all daily group sessions and scheduled AA groups  - Actively practices coping skills through participation in the therapeutic community and adherence to program rules  - Reviews and completes assignments from individual treatment plan  - Assist patient development of understanding of their personal cycle of addiction and relapse triggers  Outcome: Progressing  Goal: By discharge, patient will have ongoing treatment plan addressing chemical dependency  Description: INTERVENTIONS:  - Assist patient with resources and/or appointments for ongoing recovery based living  Outcome: Progressing

## 2022-02-04 NOTE — ASSESSMENT & PLAN NOTE
· Patient reports intermittent cocaine abuse but denies opioid abuse     · Reports he occasionally snorts cocaine every 3-4 months   · States he took 2 snorts of what he thought was cocaine prior to admission  · Pending results of Expanded Toxicology Screen  · Consult case management for further coordination of care- patient not interested in substance use treatment at this time

## 2022-02-05 VITALS
HEIGHT: 66 IN | OXYGEN SATURATION: 96 % | BODY MASS INDEX: 29.09 KG/M2 | SYSTOLIC BLOOD PRESSURE: 132 MMHG | DIASTOLIC BLOOD PRESSURE: 76 MMHG | RESPIRATION RATE: 18 BRPM | WEIGHT: 181 LBS | HEART RATE: 63 BPM | TEMPERATURE: 98.1 F

## 2022-02-05 LAB
ALBUMIN SERPL BCP-MCNC: 3.9 G/DL (ref 3–5.2)
ALP SERPL-CCNC: 67 U/L (ref 43–122)
ALT SERPL W P-5'-P-CCNC: 26 U/L
ANION GAP SERPL CALCULATED.3IONS-SCNC: 7 MMOL/L (ref 5–14)
AST SERPL W P-5'-P-CCNC: 27 U/L (ref 17–59)
BASOPHILS # BLD AUTO: 0 THOUSANDS/ΜL (ref 0–0.1)
BASOPHILS NFR BLD AUTO: 0 % (ref 0–1)
BILIRUB SERPL-MCNC: 0.47 MG/DL
BUN SERPL-MCNC: 12 MG/DL (ref 5–25)
CALCIUM SERPL-MCNC: 8.3 MG/DL (ref 8.4–10.2)
CHLORIDE SERPL-SCNC: 98 MMOL/L (ref 97–108)
CO2 SERPL-SCNC: 34 MMOL/L (ref 22–30)
CREAT SERPL-MCNC: 0.92 MG/DL (ref 0.7–1.5)
EOSINOPHIL # BLD AUTO: 0.1 THOUSAND/ΜL (ref 0–0.4)
EOSINOPHIL NFR BLD AUTO: 2 % (ref 0–6)
ERYTHROCYTE [DISTWIDTH] IN BLOOD BY AUTOMATED COUNT: 13.8 %
GFR SERPL CREATININE-BSD FRML MDRD: 100 ML/MIN/1.73SQ M
GLUCOSE SERPL-MCNC: 100 MG/DL (ref 70–99)
HCT VFR BLD AUTO: 38.9 % (ref 41–53)
HGB BLD-MCNC: 13.4 G/DL (ref 13.5–17.5)
LYMPHOCYTES # BLD AUTO: 2.6 THOUSANDS/ΜL (ref 0.5–4)
LYMPHOCYTES NFR BLD AUTO: 33 % (ref 25–45)
MCH RBC QN AUTO: 31.4 PG (ref 26–34)
MCHC RBC AUTO-ENTMCNC: 34.5 G/DL (ref 31–36)
MCV RBC AUTO: 91 FL (ref 80–100)
MONOCYTES # BLD AUTO: 0.7 THOUSAND/ΜL (ref 0.2–0.9)
MONOCYTES NFR BLD AUTO: 9 % (ref 1–10)
NEUTROPHILS # BLD AUTO: 4.5 THOUSANDS/ΜL (ref 1.8–7.8)
NEUTS SEG NFR BLD AUTO: 57 % (ref 45–65)
PLATELET # BLD AUTO: 163 THOUSANDS/UL (ref 150–450)
PMV BLD AUTO: 8.7 FL (ref 8.9–12.7)
POTASSIUM SERPL-SCNC: 3.2 MMOL/L (ref 3.6–5)
PROT SERPL-MCNC: 6.9 G/DL (ref 5.9–8.4)
RBC # BLD AUTO: 4.28 MILLION/UL (ref 4.5–5.9)
SODIUM SERPL-SCNC: 139 MMOL/L (ref 137–147)
WBC # BLD AUTO: 8 THOUSAND/UL (ref 4.5–11)

## 2022-02-05 PROCEDURE — 85025 COMPLETE CBC W/AUTO DIFF WBC: CPT | Performed by: EMERGENCY MEDICINE

## 2022-02-05 PROCEDURE — 94762 N-INVAS EAR/PLS OXIMTRY CONT: CPT

## 2022-02-05 PROCEDURE — 99239 HOSP IP/OBS DSCHRG MGMT >30: CPT

## 2022-02-05 PROCEDURE — 80053 COMPREHEN METABOLIC PANEL: CPT | Performed by: EMERGENCY MEDICINE

## 2022-02-05 RX ORDER — MECLIZINE HYDROCHLORIDE 25 MG/1
25 TABLET ORAL EVERY 12 HOURS PRN
Qty: 30 TABLET | Refills: 0 | Status: SHIPPED | OUTPATIENT
Start: 2022-02-05

## 2022-02-05 RX ORDER — POTASSIUM CHLORIDE 20 MEQ/1
40 TABLET, EXTENDED RELEASE ORAL ONCE
Status: COMPLETED | OUTPATIENT
Start: 2022-02-05 | End: 2022-02-05

## 2022-02-05 RX ADMIN — MECLIZINE 25 MG: 12.5 TABLET ORAL at 00:44

## 2022-02-05 RX ADMIN — DIPHENHYDRAMINE HCL 25 MG: 25 TABLET ORAL at 03:44

## 2022-02-05 RX ADMIN — MECLIZINE 25 MG: 12.5 TABLET ORAL at 10:21

## 2022-02-05 RX ADMIN — POTASSIUM CHLORIDE 40 MEQ: 1500 TABLET, EXTENDED RELEASE ORAL at 09:48

## 2022-02-05 NOTE — ASSESSMENT & PLAN NOTE
· Patient reports that he has had symptoms of dizziness and weakness ever since he had COVID a while back  · To provider, patient reports dizziness when attempting to get up  Has not tried to get out of bed much  · With PT, patient reports vertiginous symptoms, however patient was not noted to have ambulatory dysfunction and recommended outpatient therapy  · Follow up echocardiogram results:  Left Ventricle: Left ventricular cavity size is normal  Wall thickness is normal  The left ventricular ejection fraction is 58%   Systolic function is normal  Wall motion is normal  Diastolic function is normal   · Pursue further diagnostic imaging such as CTA head and neck or MRI if patient has persistent disruptive vertigo/dizziness especially if patient develops neurologic symptoms  · Encourage ambulation around the unit  · Improvement with Meclizine  · Discharge with Meclizine and referral for PCP for continued follow up care

## 2022-02-05 NOTE — DISCHARGE SUMMARY
[facility]  Discharge- [address] 1975, 42 y o  male MRN: [id number]  Unit/Bed#: 5T DETOX 501-01 Encounter: [id number]  Primary Care Provider: No primary care provider on file  Date and time admitted to hospital: 2/2/2022  3:21 PM    MEDICAL DETOX UNIT, LEVEL 4  Department of Medical Toxicology  Reason for Admission/Principal Problem:  Opioid overdose   Admitting provider: Aj Gill PA-C  No att  providers found   2/2/2022  3:21 PM       Discharging Physician / Practitioner: Aj Gill PA-C  PCP: No primary care provider on file  Admission Date:   Admission Orders (From admission, onward)     Ordered        02/02/22 1611  Inpatient Admission  Once                      Discharge Date: 02/05/22    Medical Problems             Resolved Problems  Never Reviewed          Resolved    Hypokalemia 2/3/2022     Resolved by  Kailey Tamayo PA-C                Dizziness  Assessment & Plan  · Patient reports that he has had symptoms of dizziness and weakness ever since he had COVID a while back  · To provider, patient reports dizziness when attempting to get up  Has not tried to get out of bed much  · With PT, patient reports vertiginous symptoms, however patient was not noted to have ambulatory dysfunction and recommended outpatient therapy  · Follow up echocardiogram results:  Left Ventricle: Left ventricular cavity size is normal  Wall thickness is normal  The left ventricular ejection fraction is 58%   Systolic function is normal  Wall motion is normal  Diastolic function is normal   · Pursue further diagnostic imaging such as CTA head and neck or MRI if patient has persistent disruptive vertigo/dizziness especially if patient develops neurologic symptoms  · Encourage ambulation around the unit  · Improvement with Meclizine  · Discharge with Meclizine and referral for PCP for continued follow up care     Acute foot pain, right  Assessment & Plan  · Reports mild dull right foot discomfort with weight-bearing/ambulation x 2 days since jumping off truck at work  · TTP of right 5th metatarsal and overlying ecchymosis noted on exam   · XR right foot- no acute fracture  · Patient has been ambulating to the bathroom without complaints    Alcohol use  Assessment & Plan  Patient with h/o occasional alcohol use  · Reports he currently consumes 5-6 beers weekly/bi-weekly  · Reports he consumed 7 beers on day prior to arrival  Pertinent labs:  Ethanol 95 (2/22/2022 1611 pm)  Given reported infrequent use of alcohol, do not suspect severe alcohol withdrawal to occur, however will monitor during admission -- patient has so far not exhibited signs of withdrawal  Encourage cessation  Consult case management for assistance with rehab resource    Prediabetes  Assessment & Plan  · hgb A1C 2/2/2022 5 8  · Educated patient regarding prediabetes  · Encourage healthy diet, exercise  · Recommend outpatient follow-up with PCP - referral made for PCP upon discharge     Hypocalcemia  Assessment & Plan  · Educated patient on calcium supplementation upon discharge  · Electrolytes replete as needed    Respiratory acidosis  Assessment & Plan  · Secondary to opioid-induced respiratory failure  · Initial VBG revealed pH 7 11, pCO2 76  · VBG this AM revealed increasing pCO2 since yesterday: pH up to 7 350, pCO2 51 --> 62  · Continue resp failure management as above   · Continue to monitor with supportive care  · Sleep studies and home O2 studies ordered for patient, obtain ABG tomorrow AM  · CXR: No acute cardiopulmonary disease    * Acute respiratory failure with hypoxia and hypercapnia (HCC)  Assessment & Plan  · Secondary to accidental opioid overdose on 2/2/2022    · Patient maintains that he is opioid naive and that he thought he was snorting cocaine    · CXR in ED 2/2/2022- no acute cardiopulmonary disease  · CT head 2/2/2022 negative   · Initial HS trop 4, 4 hr trop 22 (delta 18)  · Patient was started on narcan drip in ED- required titration up to 4 mg/hr  · Narcan drip stopped 0455 yesterday  · Respiratory acidosis improving   · Continue to monitor vitals/symptoms- continuous pulse ox revealed occasionally desaturation down to mid-to-low 80s on room air   · Patient evaluated by RT  · At this time patient does not require any oxygen supplementation when ambulating   · Referral placed for sleep study upon discharge as patient is suspected to have sleep apnea         Consultations During Hospital Stay:  · None    Procedures Performed:   · None    Significant Findings / Test Results:   · XR of chest: no acute cardiopulmonary disease     Incidental Findings:   · Respiratory acidosis     Test Results Pending at Discharge (will require follow up): · None     Outpatient Tests/ Follow Up Requested:  · Follow up with PCP referral   · Follow up with referral for sleep study     Complications:  None     Reason for Admission: opioid overdose     Hospital Course:     Gelacio Toscano is a 42 y o  male patient with a past medical history of cocaine abuse and intermittent alcohol use who originally presented to the hospital on 2/2/2022 due to respiratory failure secondary to accidental opioid overdose, patient was then started on oxygen supplementation and  narcan drip which were both continued upon admission to detox unit  Narcan drip was discontinued on 2/3/22 @5am as patient was awake and oriented  On hospital day 1, initial ABG showed respiratory acidosis, patient was continued on respiratory failure protocol and continued to require oxygen supplementation of 2L via nasal cannula due to continued hypoxia as patient continued to present with desaturation of SpO2 <90%  Due to patient's hypoxia and episodes of vomiting upon admission a CXR was obtained to rule out possible aspiration  CXR resulted with no acute cardiopulmonary findings   Patient continued to endorse no history of prior opioid use and did not present with any significant withdrawals during hospitalization  Patient did endorse episodes of dizziness and unsteadiness while ambulating along with acute foot pain  XR right foot was obtained, no acute fracture was seen  Patient was started on meclizine for persistent dizziness on hospital day 2  On hospital day 3 pulse oximetry and home O2 study were both completed to obtain recommendations for oxygen supplementation  As respiratory acidosis improved,  it was determined that patient did not need to be continued on oxygen supplementation as his SpO2 >90% when ambulating  Respiratory Therapy did request an official sleep study be completed as outpatient for suspected sleep apnea  Referral was sent for sleep study by case management,     Please see above list of diagnoses and related plan for additional information  Condition at Discharge: stable     Discharge Day Visit / Exam:     Subjective:  Patient was seen and examined at bedside with Faroese   Vitals: Blood Pressure: 132/76 (02/05/22 0740)  Pulse: 63 (02/05/22 0740)  Temperature: 98 1 °F (36 7 °C) (02/05/22 0740)  Temp Source: Temporal (02/05/22 0740)  Respirations: 18 (02/05/22 0740)  Height: 5' 6" (167 6 cm) (02/04/22 0834)  Weight - Scale: 82 1 kg (181 lb) (02/04/22 0834)  SpO2: 96 % (02/05/22 1015)  Exam:   Physical Exam  Constitutional:       General: He is not in acute distress  HENT:      Head: Normocephalic and atraumatic  Eyes:      Pupils: Pupils are equal, round, and reactive to light  Cardiovascular:      Rate and Rhythm: Normal rate and regular rhythm  Heart sounds: No murmur heard  Pulmonary:      Effort: No respiratory distress  Abdominal:      General: There is no distension  Palpations: Abdomen is soft  Tenderness: There is no abdominal tenderness  Musculoskeletal:         General: No swelling  Skin:     General: Skin is warm and dry     Neurological:      Mental Status: He is oriented to person, place, and time  Discussion with Family: I personally discussed this patient discharge instructions with  present to patient's wife  Discharge instructions/Information to patient and family:   See after visit summary for information provided to patient and family  Provisions for Follow-Up Care:  See after visit summary for information related to follow-up care and any pertinent home health orders  Disposition:     Home    For Discharges to [facility] SNF:   · Not Applicable to this Patient - Not Applicable to this Patient    Planned Readmission: none     Discharge Statement:  I spent 40 minutes discharging the patient  This time was spent on the day of discharge  I had direct contact with the patient on the day of discharge  Greater than 50% of the total time was spent examining patient, answering all patient questions, arranging and discussing plan of care with patient as well as directly providing post-discharge instructions  Additional time then spent on discharge activities  Discharge Medications:  See after visit summary for reconciled discharge medications provided to patient and family        ** Please Note: This note has been constructed using a voice recognition system **

## 2022-02-05 NOTE — ASSESSMENT & PLAN NOTE
· Secondary to accidental opioid overdose on 2/2/2022    · Patient maintains that he is opioid naive and that he thought he was snorting cocaine    · CXR in ED 2/2/2022- no acute cardiopulmonary disease  · CT head 2/2/2022 negative   · Initial HS trop 4, 4 hr trop 22 (delta 18)  · Patient was started on narcan drip in ED- required titration up to 4 mg/hr  · Narcan drip stopped 0455 yesterday  · Respiratory acidosis improving   · Continue to monitor vitals/symptoms- continuous pulse ox revealed occasionally desaturation down to mid-to-low 80s on room air   · Patient evaluated by RT  · At this time patient does not require any oxygen supplementation when ambulating   · Referral placed for sleep study upon discharge as patient is suspected to have sleep apnea

## 2022-02-05 NOTE — NURSING NOTE
Patient alert, oriented  Jordanian speaker  Scratches noticed to neck, back, arms, legs, ankle area, self inflicted from scratching  Patient stated he has "itching sensation all over since I got here for two days "    Patient showered [facility]  Shower chair provided  Moisturizer applied to help with the itching  Benadryl administered as ordered  Some dizziness experienced tonight when he turned his head left and right somewhat fast, or walking to bathroom  Stated during shower, he felt dizzy  Next meclizine medication is after midnight  Patient's wife called tonight  Described how she had been waking him up many times at night, while he slept, due to "he sounded as if he stopped breathing " Wife expressed her concern about pt "maybe needing a c-pap", but said "he is too stubborn and did not want to go see a doctor about it "    Patient started sleep study [facility] at 2123 for O2 evaluation  Setting set at 80% - 100%  Oxygen NC to be applied if the SaO2 drops below 80%  At 2255, patient woke up scratching again  Refused moisturizer  SaO2 ranging 88-95% in room air the past hour  No SOB  No distress noticed  Resume sleep

## 2022-02-05 NOTE — CASE MANAGEMENT
Case Management Discharge Planning Note    Patient name Saúl Gusman  Location 5T DETOX 501/5T DETOX 50* MRN [id number]  : 1975 Date 2022       Current Admission Date: 2022  Current Admission Diagnosis:Acute respiratory failure with hypoxia and hypercapnia [facility])   Patient Active Problem List    Diagnosis Date Noted    Dizziness 2022    Acute foot pain, right 2022    Acute respiratory failure with hypoxia and hypercapnia (HCC) 2022    Respiratory acidosis 2022    Substance use disorder 2022    Hypocalcemia 2022    Prediabetes 2022    Alcohol use 2022      LOS (days): 3  Geometric Mean LOS (GMLOS) (days):   Days to GMLOS:     OBJECTIVE:  Risk of Unplanned Readmission Score: 15         Current admission status: Inpatient   Preferred Pharmacy:   [facility]  No address on file      [id number] [place] - [address] [address]  Phone: [phone] Fax: [phone]    Primary Care Provider: No primary care provider on file  Primary Insurance:   Secondary Insurance:     DISCHARGE DETAILS: CM met with pt and used  to discuss discharge plans with pt and his wife who was present via telephone  CM reviewed pt's outpatient follow up and informed pt that CM sent message to [facility]; CM relayed that if pt does not hear from the Center to contact them to set up appointment for sleep study  CM encouraged pt to contact [facility] as soon as possible to establish care with PCP  [facility] [address] [facility]  Pt's wife stated that she will transport pt home upon discharge  CM asked if pt needed any further assistance from case management to which he declined  Pt will be discharged home       Discharge planning discussed with[de-identified] Patient and his wife  Freedom of Choice: Yes Contacts  Patient Contacts: Spouse (Melony)  Relationship to Patient[de-identified] Family  Contact Method: Phone  Reason/Outcome: Discharge Planning              Other Referral/Resources/Interventions Provided:  Referrals Provided[de-identified] Crisis Hotline,IOP,Support Group (inpatient substance use treatment)    Would you like to participate in our [facility] service program?  : No - Declined                                  Accompanied by: Alone

## 2022-02-06 NOTE — ASSESSMENT & PLAN NOTE
· Improved  · Secondary to opioid-induced respiratory failure      · Initial VBG revealed pH 7 11, pCO2 76  · Most recent VBG pH 7 38, pCO2 58   · VBG this AM revealed increasing pCO2 since yesterday: pH up to 7 350, pCO2 51 --> 62  · Sleep studies and home O2 studies completed for patient--> no oxygen requirement needed upon discharge  · CXR: No acute cardiopulmonary disease

## 2022-02-06 NOTE — ASSESSMENT & PLAN NOTE
· hgb A1C 2/2/2022 5 8  · Educated patient regarding prediabetes  · Encourage healthy diet, exercise    · Recommend outpatient follow-up with PCP - referral made for PCP upon discharge

## 2022-02-22 NOTE — ASSESSMENT & PLAN NOTE
Patient with h/o occasional alcohol use  · Reports he currently consumes 5-6 beers weekly/bi-weekly  · Reports he consumed 7 beers on day prior to arrival  Pertinent labs:  Ethanol 95 (2/22/2022 1611 pm)  Given reported infrequent use of alcohol, do not suspect severe alcohol withdrawal to occur, however will monitor during admission -- patient has so far not exhibited signs of withdrawal  Encourage cessation  Consult case management for assistance with rehab resource Adult

## 2022-02-23 NOTE — UTILIZATION REVIEW
Inpatient Admission Authorization Request   NOTIFICATION OF INPATIENT ADMISSION/INPATIENT AUTHORIZATION REQUEST   SERVICING FACILITY:   [address] [place] [address]  [address] , [facility], [address] [place] [address]  Tax ID: [id number]  NPI: [id number]  Place of Service: Inpatient [facility]  [address]  Place of Service Code: 24     ATTENDING PROVIDER:  Attending Name and NPI#: Kimi Sy [[id number]]  Address: [address] , [facility], [address] [place] [address]  Phone: [phone]     UTILIZATION REVIEW CONTACT:  Magdaleno Lopez, Utilization   Network Utilization Review Department  Phone: [phone]  Fax [phone]  Email: [email or web address]     PHYSICIAN ADVISORY SERVICES:  FOR [id number] REVIEW - MEDICAL NECESSITY DENIAL  Phone: [phone]  Fax: [phone]  Email: [email or web address]     TYPE OF REQUEST:  Inpatient Status     ADMISSION INFORMATION:  ADMISSION DATE/TIME: 2/2/22  4:10 PM  PATIENT DIAGNOSIS CODE/DESCRIPTION:  Acute respiratory failure (HCC) [J96 00]  Hypokalemia [E87 6]  Overdose [T50 901A]  Alcohol use [Z72 89]  Opioid overdose ([address] ) [T40 2X1A]  Cocaine use [F14 90]  DISCHARGE DATE/TIME: 2/5/2022 12:18 PM  DISCHARGE DISPOSITION (IF DISCHARGED): Home/Self Care     IMPORTANT INFORMATION:  Please contact the [facility] directly with any questions or concerns regarding this request  Department voicemails are confidential     Send requests for admission clinical reviews, concurrent reviews, approvals, and administrative denials due to lack of clinical to fax [phone]